# Patient Record
Sex: MALE | Race: WHITE | Employment: FULL TIME | URBAN - NONMETROPOLITAN AREA
[De-identification: names, ages, dates, MRNs, and addresses within clinical notes are randomized per-mention and may not be internally consistent; named-entity substitution may affect disease eponyms.]

---

## 2018-06-21 ENCOUNTER — OFFICE VISIT (OUTPATIENT)
Dept: FAMILY MEDICINE CLINIC | Age: 58
End: 2018-06-21
Payer: COMMERCIAL

## 2018-06-21 VITALS
DIASTOLIC BLOOD PRESSURE: 66 MMHG | SYSTOLIC BLOOD PRESSURE: 120 MMHG | WEIGHT: 209 LBS | BODY MASS INDEX: 31.67 KG/M2 | HEART RATE: 80 BPM | HEIGHT: 68 IN

## 2018-06-21 DIAGNOSIS — M65.312 TRIGGER FINGER OF LEFT THUMB: Primary | ICD-10-CM

## 2018-06-21 PROCEDURE — 99202 OFFICE O/P NEW SF 15 MIN: CPT | Performed by: FAMILY MEDICINE

## 2018-06-21 RX ORDER — MELOXICAM 15 MG/1
15 TABLET ORAL DAILY
Qty: 30 TABLET | Refills: 2 | Status: SHIPPED | OUTPATIENT
Start: 2018-06-21 | End: 2019-07-09

## 2018-06-21 RX ORDER — RANITIDINE 150 MG/1
150 TABLET ORAL 2 TIMES DAILY
COMMUNITY
End: 2019-07-09

## 2018-06-21 RX ORDER — FEXOFENADINE HYDROCHLORIDE 60 MG/1
60 TABLET, FILM COATED ORAL DAILY
COMMUNITY
End: 2021-02-25

## 2018-06-21 RX ORDER — CETIRIZINE HYDROCHLORIDE 10 MG/1
10 TABLET ORAL DAILY
COMMUNITY
End: 2018-06-21 | Stop reason: CLARIF

## 2018-06-21 RX ORDER — OMEPRAZOLE 20 MG/1
20 CAPSULE, DELAYED RELEASE ORAL DAILY
COMMUNITY
End: 2018-06-21 | Stop reason: CLARIF

## 2018-06-21 ASSESSMENT — PATIENT HEALTH QUESTIONNAIRE - PHQ9
SUM OF ALL RESPONSES TO PHQ QUESTIONS 1-9: 0
2. FEELING DOWN, DEPRESSED OR HOPELESS: 0
SUM OF ALL RESPONSES TO PHQ9 QUESTIONS 1 & 2: 0
1. LITTLE INTEREST OR PLEASURE IN DOING THINGS: 0

## 2019-07-09 ENCOUNTER — OFFICE VISIT (OUTPATIENT)
Dept: FAMILY MEDICINE CLINIC | Age: 59
End: 2019-07-09
Payer: COMMERCIAL

## 2019-07-09 VITALS
HEIGHT: 68 IN | WEIGHT: 212 LBS | HEART RATE: 92 BPM | SYSTOLIC BLOOD PRESSURE: 126 MMHG | DIASTOLIC BLOOD PRESSURE: 62 MMHG | BODY MASS INDEX: 32.13 KG/M2 | OXYGEN SATURATION: 97 %

## 2019-07-09 DIAGNOSIS — Z00.01 ENCOUNTER FOR WELL ADULT EXAM WITH ABNORMAL FINDINGS: Primary | ICD-10-CM

## 2019-07-09 DIAGNOSIS — Z11.59 ENCOUNTER FOR HEPATITIS C SCREENING TEST FOR LOW RISK PATIENT: ICD-10-CM

## 2019-07-09 DIAGNOSIS — M79.601 PAIN IN BOTH UPPER EXTREMITIES: ICD-10-CM

## 2019-07-09 DIAGNOSIS — Z23 NEED FOR TDAP VACCINATION: ICD-10-CM

## 2019-07-09 DIAGNOSIS — M79.602 PAIN IN BOTH UPPER EXTREMITIES: ICD-10-CM

## 2019-07-09 DIAGNOSIS — Z13.1 SCREENING FOR DIABETES MELLITUS: ICD-10-CM

## 2019-07-09 DIAGNOSIS — Z12.5 SCREENING FOR PROSTATE CANCER: ICD-10-CM

## 2019-07-09 DIAGNOSIS — Z12.11 SCREEN FOR COLON CANCER: ICD-10-CM

## 2019-07-09 DIAGNOSIS — Z13.220 SCREENING, LIPID: ICD-10-CM

## 2019-07-09 PROCEDURE — 99396 PREV VISIT EST AGE 40-64: CPT | Performed by: FAMILY MEDICINE

## 2019-07-09 PROCEDURE — 90715 TDAP VACCINE 7 YRS/> IM: CPT | Performed by: FAMILY MEDICINE

## 2019-07-09 PROCEDURE — 90471 IMMUNIZATION ADMIN: CPT | Performed by: FAMILY MEDICINE

## 2019-07-09 RX ORDER — IBUPROFEN 800 MG/1
800 TABLET ORAL EVERY 6 HOURS PRN
COMMUNITY
End: 2019-07-09 | Stop reason: ALTCHOICE

## 2019-07-09 RX ORDER — MELOXICAM 15 MG/1
15 TABLET ORAL DAILY
Qty: 30 TABLET | Refills: 1 | Status: ON HOLD | OUTPATIENT
Start: 2019-07-09 | End: 2021-01-16

## 2019-07-09 RX ORDER — CEPHALEXIN 500 MG/1
500 CAPSULE ORAL 4 TIMES DAILY
Status: ON HOLD | COMMUNITY
End: 2021-01-16

## 2019-07-09 ASSESSMENT — ENCOUNTER SYMPTOMS: SHORTNESS OF BREATH: 0

## 2019-07-09 ASSESSMENT — PATIENT HEALTH QUESTIONNAIRE - PHQ9
SUM OF ALL RESPONSES TO PHQ9 QUESTIONS 1 & 2: 0
1. LITTLE INTEREST OR PLEASURE IN DOING THINGS: 0
SUM OF ALL RESPONSES TO PHQ QUESTIONS 1-9: 0
2. FEELING DOWN, DEPRESSED OR HOPELESS: 0
SUM OF ALL RESPONSES TO PHQ QUESTIONS 1-9: 0

## 2019-07-09 NOTE — PROGRESS NOTES
[address] [address]  Dept: [phone]  Dept Fax: [phone]    Kadie Mcallister is a 64 y.o. male who presents today for his medical conditions/complaints as noted below. Kadie Mcallister c/o of Annual Exam      HPI:     HPI  Patient here for annual wellness visit. Also at this time complaining of right arm muscular pain. Right wrist pain feeling related to likely carpal tunnel and left thigh pain cramping at times. No attempted treatments for these issues have been taken at this time. Left hand numbness daily, only occasionally while sleeping. Right forearm pain, often painting as well as \"throwing posts\"  Cramps posterior left thigh and noted with prostate cancer issues on internet could show issues in thighs    Health team concerned from biometric screening for BP per pt. BP Readings from Last 3 Encounters:   07/09/19 126/62   06/21/18 120/66          (goal 120/80)    No past medical history on file. No past surgical history on file. No family history on file. Social History     Tobacco Use    Smoking status: Never Smoker    Smokeless tobacco: Never Used   Substance Use Topics    Alcohol use: Yes     Comment: Once in a while      Prior to Admission medications    Medication Sig Start Date End Date Taking?  Authorizing Provider   cephALEXin (KEFLEX) 500 MG capsule Take 500 mg by mouth 4 times daily   Yes Historical Provider, MD   ibuprofen (ADVIL;MOTRIN) 800 MG tablet Take 800 mg by mouth every 6 hours as needed for Pain   Yes Historical Provider, MD   meloxicam (MOBIC) 15 MG tablet Take 1 tablet by mouth daily 7/9/19  Yes Leslie Renner MD   fexofenadine (ALLEGRA ALLERGY) 60 MG tablet Take 60 mg by mouth daily   Yes Historical Provider, MD     Allergies   Allergen Reactions    Penicillins        Health Maintenance   Topic Date Due    Hepatitis C screen  1960    HIV screen  04/21/1977    DTaP/Tdap/Td vaccine (1 - Tdap)

## 2019-07-11 LAB
CHOLESTEROL/HDL RATIO: 3.2 RATIO (ref 0–4.5)
CHOLESTEROL: 185 MG/DL (ref 50–200)
GLUCOSE: 105 MG/DL (ref 75–110)
HDL, DIRECT: 57 MG/DL (ref 36–68)
HEPATITIS C IGG: NORMAL
LDL CHOLESTEROL CALCULATED: 112.4 MG/DL (ref 0–160)
SCREENING PSA: 0.93 NG/ML (ref 0–4)
SIGNAL/CUTOFF: NORMAL
TRIGL SERPL-MCNC: 78 MG/DL (ref 10–250)
VLDLC SERPL CALC-MCNC: 16 MG/DL (ref 0–40)

## 2019-07-30 DIAGNOSIS — Z12.11 SCREEN FOR COLON CANCER: ICD-10-CM

## 2019-08-08 PROBLEM — Z00.01 ENCOUNTER FOR WELL ADULT EXAM WITH ABNORMAL FINDINGS: Status: RESOLVED | Noted: 2019-07-09 | Resolved: 2019-08-08

## 2021-01-14 ENCOUNTER — HOSPITAL ENCOUNTER (INPATIENT)
Age: 61
LOS: 1 days | Discharge: HOME | DRG: 280 | End: 2021-01-16
Attending: EMERGENCY MEDICINE | Admitting: INTERNAL MEDICINE
Payer: COMMERCIAL

## 2021-01-14 DIAGNOSIS — I21.4 NSTEMI (NON-ST ELEVATED MYOCARDIAL INFARCTION) (HCC): Primary | ICD-10-CM

## 2021-01-14 LAB
ANTI-XA UNFRAC HEPARIN: <0.1 IU/L (ref 0.3–0.7)
HCT VFR BLD CALC: 45.9 % (ref 40.7–50.3)
HEMOGLOBIN: 14.6 G/DL (ref 13–17)
INR BLD: 1
MCH RBC QN AUTO: 29.6 PG (ref 25.2–33.5)
MCHC RBC AUTO-ENTMCNC: 31.8 G/DL (ref 28.4–34.8)
MCV RBC AUTO: 93.1 FL (ref 82.6–102.9)
MYOGLOBIN: 146 NG/ML (ref 28–72)
NRBC AUTOMATED: 0 PER 100 WBC
PARTIAL THROMBOPLASTIN TIME: 30.5 SEC (ref 23.9–33.8)
PDW BLD-RTO: 13.2 % (ref 11.8–14.4)
PLATELET # BLD: 272 K/UL (ref 138–453)
PMV BLD AUTO: 9.6 FL (ref 8.1–13.5)
PROTHROMBIN TIME: 12.6 SEC (ref 11.5–14.2)
RBC # BLD: 4.93 M/UL (ref 4.21–5.77)
SARS-COV-2: POSITIVE
TROPONIN INTERP: ABNORMAL
TROPONIN T: ABNORMAL NG/ML
TROPONIN, HIGH SENSITIVITY: 886 NG/L (ref 0–22)
WBC # BLD: 13.8 K/UL (ref 3.5–11.3)

## 2021-01-14 PROCEDURE — 6360000002 HC RX W HCPCS: Performed by: EMERGENCY MEDICINE

## 2021-01-14 PROCEDURE — 99284 EMERGENCY DEPT VISIT MOD MDM: CPT

## 2021-01-14 PROCEDURE — 84484 ASSAY OF TROPONIN QUANT: CPT

## 2021-01-14 PROCEDURE — 83874 ASSAY OF MYOGLOBIN: CPT

## 2021-01-14 PROCEDURE — 85730 THROMBOPLASTIN TIME PARTIAL: CPT

## 2021-01-14 PROCEDURE — 2500000003 HC RX 250 WO HCPCS: Performed by: EMERGENCY MEDICINE

## 2021-01-14 PROCEDURE — 85520 HEPARIN ASSAY: CPT

## 2021-01-14 PROCEDURE — 85610 PROTHROMBIN TIME: CPT

## 2021-01-14 PROCEDURE — G0378 HOSPITAL OBSERVATION PER HR: HCPCS

## 2021-01-14 PROCEDURE — 85027 COMPLETE CBC AUTOMATED: CPT

## 2021-01-14 PROCEDURE — 93005 ELECTROCARDIOGRAM TRACING: CPT | Performed by: EMERGENCY MEDICINE

## 2021-01-14 RX ORDER — HEPARIN SODIUM 1000 [USP'U]/ML
2000 INJECTION, SOLUTION INTRAVENOUS; SUBCUTANEOUS PRN
Status: DISCONTINUED | OUTPATIENT
Start: 2021-01-14 | End: 2021-01-15 | Stop reason: SDUPTHER

## 2021-01-14 RX ORDER — HEPARIN SODIUM 1000 [USP'U]/ML
4000 INJECTION, SOLUTION INTRAVENOUS; SUBCUTANEOUS PRN
Status: DISCONTINUED | OUTPATIENT
Start: 2021-01-14 | End: 2021-01-15 | Stop reason: SDUPTHER

## 2021-01-14 RX ORDER — HEPARIN SODIUM 1000 [USP'U]/ML
4000 INJECTION, SOLUTION INTRAVENOUS; SUBCUTANEOUS ONCE
Status: COMPLETED | OUTPATIENT
Start: 2021-01-14 | End: 2021-01-14

## 2021-01-14 RX ORDER — HEPARIN SODIUM 10000 [USP'U]/100ML
10.8 INJECTION, SOLUTION INTRAVENOUS CONTINUOUS
Status: DISCONTINUED | OUTPATIENT
Start: 2021-01-14 | End: 2021-01-15 | Stop reason: SDUPTHER

## 2021-01-14 RX ORDER — NITROGLYCERIN 20 MG/100ML
10 INJECTION INTRAVENOUS CONTINUOUS
Status: DISCONTINUED | OUTPATIENT
Start: 2021-01-14 | End: 2021-01-16 | Stop reason: HOSPADM

## 2021-01-14 RX ADMIN — NITROGLYCERIN 10 MCG/MIN: 20 INJECTION INTRAVENOUS at 23:22

## 2021-01-14 RX ADMIN — HEPARIN SODIUM 4000 UNITS: 1000 INJECTION INTRAVENOUS; SUBCUTANEOUS at 23:44

## 2021-01-14 RX ADMIN — HEPARIN SODIUM AND DEXTROSE 10.8 UNITS/KG/HR: 10000; 5 INJECTION INTRAVENOUS at 23:47

## 2021-01-14 ASSESSMENT — ENCOUNTER SYMPTOMS
EYE DISCHARGE: 0
VOMITING: 0
ABDOMINAL PAIN: 0
DIARRHEA: 0
CONSTIPATION: 0
COLOR CHANGE: 0
SHORTNESS OF BREATH: 0
COUGH: 0
FACIAL SWELLING: 0
EYE REDNESS: 0

## 2021-01-14 ASSESSMENT — PAIN DESCRIPTION - LOCATION: LOCATION: CHEST

## 2021-01-14 ASSESSMENT — PAIN SCALES - GENERAL: PAINLEVEL_OUTOF10: 4

## 2021-01-15 PROBLEM — G47.33 OSA (OBSTRUCTIVE SLEEP APNEA): Status: ACTIVE | Noted: 2021-01-15

## 2021-01-15 PROBLEM — I10 ESSENTIAL HYPERTENSION: Status: ACTIVE | Noted: 2021-01-15

## 2021-01-15 PROBLEM — E66.9 OBESITY (BMI 30-39.9): Status: ACTIVE | Noted: 2019-07-09

## 2021-01-15 PROBLEM — U07.1 COVID-19: Status: ACTIVE | Noted: 2021-01-15

## 2021-01-15 LAB
ADENOVIRUS PCR: NOT DETECTED
ALBUMIN SERPL-MCNC: 3.8 G/DL (ref 3.5–5.2)
ALBUMIN/GLOBULIN RATIO: ABNORMAL (ref 1–2.5)
ALP BLD-CCNC: 70 U/L (ref 40–129)
ALT SERPL-CCNC: 38 U/L (ref 5–41)
ANION GAP SERPL CALCULATED.3IONS-SCNC: 9 MMOL/L (ref 9–17)
ANTI-XA UNFRAC HEPARIN: 0.21 IU/L (ref 0.3–0.7)
ANTI-XA UNFRAC HEPARIN: <0.1 IU/L (ref 0.3–0.7)
AST SERPL-CCNC: 164 U/L
BILIRUB SERPL-MCNC: 1.23 MG/DL (ref 0.3–1.2)
BNP INTERPRETATION: ABNORMAL
BORDETELLA PARAPERTUSSIS: NOT DETECTED
BORDETELLA PERTUSSIS PCR: NOT DETECTED
BUN BLDV-MCNC: 15 MG/DL (ref 6–20)
BUN/CREAT BLD: 16 (ref 9–20)
C-REACTIVE PROTEIN: 19.6 MG/L (ref 0–5)
CALCIUM SERPL-MCNC: 9.1 MG/DL (ref 8.6–10.4)
CHLAMYDIA PNEUMONIAE BY PCR: NOT DETECTED
CHLORIDE BLD-SCNC: 103 MMOL/L (ref 98–107)
CHOLESTEROL/HDL RATIO: 2.7
CHOLESTEROL: 165 MG/DL
CO2: 25 MMOL/L (ref 20–31)
CORONAVIRUS 229E PCR: NOT DETECTED
CORONAVIRUS HKU1 PCR: NOT DETECTED
CORONAVIRUS NL63 PCR: NOT DETECTED
CORONAVIRUS OC43 PCR: NOT DETECTED
CREAT SERPL-MCNC: 0.92 MG/DL (ref 0.7–1.2)
D-DIMER QUANTITATIVE: <0.27 MG/L FEU (ref 0–0.59)
FERRITIN: 263 UG/L (ref 30–400)
FIBRINOGEN: 502 MG/DL (ref 179–518)
GFR AFRICAN AMERICAN: >60 ML/MIN
GFR NON-AFRICAN AMERICAN: >60 ML/MIN
GFR SERPL CREATININE-BSD FRML MDRD: ABNORMAL ML/MIN/{1.73_M2}
GFR SERPL CREATININE-BSD FRML MDRD: ABNORMAL ML/MIN/{1.73_M2}
GLUCOSE BLD-MCNC: 126 MG/DL (ref 70–99)
HCT VFR BLD CALC: 44.2 % (ref 40.7–50.3)
HCT VFR BLD CALC: 45 % (ref 40.7–50.3)
HDLC SERPL-MCNC: 62 MG/DL
HEMOGLOBIN: 14.2 G/DL (ref 13–17)
HEMOGLOBIN: 14.4 G/DL (ref 13–17)
HUMAN METAPNEUMOVIRUS PCR: NOT DETECTED
INFLUENZA A BY PCR: NOT DETECTED
INFLUENZA A H1 (2009) PCR: NORMAL
INFLUENZA A H1 PCR: NORMAL
INFLUENZA A H3 PCR: NORMAL
INFLUENZA B BY PCR: NOT DETECTED
INR BLD: 1
LACTATE DEHYDROGENASE: 678 U/L (ref 135–225)
LDL CHOLESTEROL: 86 MG/DL (ref 0–130)
MAGNESIUM: 1.8 MG/DL (ref 1.6–2.6)
MCH RBC QN AUTO: 29.4 PG (ref 25.2–33.5)
MCH RBC QN AUTO: 29.5 PG (ref 25.2–33.5)
MCHC RBC AUTO-ENTMCNC: 32 G/DL (ref 28.4–34.8)
MCHC RBC AUTO-ENTMCNC: 32.1 G/DL (ref 28.4–34.8)
MCV RBC AUTO: 91.8 FL (ref 82.6–102.9)
MCV RBC AUTO: 91.9 FL (ref 82.6–102.9)
MYCOPLASMA PNEUMONIAE PCR: NOT DETECTED
MYOGLOBIN: 128 NG/ML (ref 28–72)
NRBC AUTOMATED: 0 PER 100 WBC
NRBC AUTOMATED: 0 PER 100 WBC
PARAINFLUENZA 1 PCR: NOT DETECTED
PARAINFLUENZA 2 PCR: NOT DETECTED
PARAINFLUENZA 3 PCR: NOT DETECTED
PARAINFLUENZA 4 PCR: NOT DETECTED
PARTIAL THROMBOPLASTIN TIME: 87.6 SEC (ref 23.9–33.8)
PDW BLD-RTO: 13.4 % (ref 11.8–14.4)
PDW BLD-RTO: 13.4 % (ref 11.8–14.4)
PLATELET # BLD: 266 K/UL (ref 138–453)
PLATELET # BLD: 269 K/UL (ref 138–453)
PMV BLD AUTO: 9.5 FL (ref 8.1–13.5)
PMV BLD AUTO: 9.6 FL (ref 8.1–13.5)
POTASSIUM SERPL-SCNC: 4 MMOL/L (ref 3.7–5.3)
PRO-BNP: 662 PG/ML
PROCALCITONIN: 0.07 NG/ML
PROTHROMBIN TIME: 13.3 SEC (ref 11.5–14.2)
RBC # BLD: 4.81 M/UL (ref 4.21–5.77)
RBC # BLD: 4.9 M/UL (ref 4.21–5.77)
RESP SYNCYTIAL VIRUS PCR: NOT DETECTED
RHINO/ENTEROVIRUS PCR: NOT DETECTED
SARS-COV-2 ANTIBODY, TOTAL: POSITIVE
SARS-COV-2, PCR: NOT DETECTED
SODIUM BLD-SCNC: 137 MMOL/L (ref 135–144)
SPECIMEN DESCRIPTION: NORMAL
TOTAL PROTEIN: 7.2 G/DL (ref 6.4–8.3)
TRIGL SERPL-MCNC: 85 MG/DL
TROPONIN INTERP: ABNORMAL
TROPONIN INTERP: ABNORMAL
TROPONIN T: ABNORMAL NG/ML
TROPONIN T: ABNORMAL NG/ML
TROPONIN, HIGH SENSITIVITY: 1040 NG/L (ref 0–22)
TROPONIN, HIGH SENSITIVITY: 952 NG/L (ref 0–22)
VITAMIN D 25-HYDROXY: 21.8 NG/ML (ref 30–100)
VLDLC SERPL CALC-MCNC: NORMAL MG/DL (ref 1–30)
WBC # BLD: 13 K/UL (ref 3.5–11.3)
WBC # BLD: 13.8 K/UL (ref 3.5–11.3)

## 2021-01-15 PROCEDURE — 6360000004 HC RX CONTRAST MEDICATION

## 2021-01-15 PROCEDURE — 85379 FIBRIN DEGRADATION QUANT: CPT

## 2021-01-15 PROCEDURE — 2709999900 HC NON-CHARGEABLE SUPPLY

## 2021-01-15 PROCEDURE — 80061 LIPID PANEL: CPT

## 2021-01-15 PROCEDURE — 36415 COLL VENOUS BLD VENIPUNCTURE: CPT

## 2021-01-15 PROCEDURE — 2580000003 HC RX 258: Performed by: INTERNAL MEDICINE

## 2021-01-15 PROCEDURE — 4A023N7 MEASUREMENT OF CARDIAC SAMPLING AND PRESSURE, LEFT HEART, PERCUTANEOUS APPROACH: ICD-10-PCS | Performed by: INTERNAL MEDICINE

## 2021-01-15 PROCEDURE — 1200000000 HC SEMI PRIVATE

## 2021-01-15 PROCEDURE — 6370000000 HC RX 637 (ALT 250 FOR IP): Performed by: INTERNAL MEDICINE

## 2021-01-15 PROCEDURE — APPSS45 APP SPLIT SHARED TIME 31-45 MINUTES: Performed by: NURSE PRACTITIONER

## 2021-01-15 PROCEDURE — 94761 N-INVAS EAR/PLS OXIMETRY MLT: CPT

## 2021-01-15 PROCEDURE — 83615 LACTATE (LD) (LDH) ENZYME: CPT

## 2021-01-15 PROCEDURE — 84145 PROCALCITONIN (PCT): CPT

## 2021-01-15 PROCEDURE — 85384 FIBRINOGEN ACTIVITY: CPT

## 2021-01-15 PROCEDURE — 84484 ASSAY OF TROPONIN QUANT: CPT

## 2021-01-15 PROCEDURE — 85730 THROMBOPLASTIN TIME PARTIAL: CPT

## 2021-01-15 PROCEDURE — 93458 L HRT ARTERY/VENTRICLE ANGIO: CPT | Performed by: INTERNAL MEDICINE

## 2021-01-15 PROCEDURE — G0378 HOSPITAL OBSERVATION PER HR: HCPCS

## 2021-01-15 PROCEDURE — 6370000000 HC RX 637 (ALT 250 FOR IP): Performed by: NURSE PRACTITIONER

## 2021-01-15 PROCEDURE — 6360000002 HC RX W HCPCS: Performed by: NURSE PRACTITIONER

## 2021-01-15 PROCEDURE — 85610 PROTHROMBIN TIME: CPT

## 2021-01-15 PROCEDURE — 86769 SARS-COV-2 COVID-19 ANTIBODY: CPT

## 2021-01-15 PROCEDURE — 85027 COMPLETE CBC AUTOMATED: CPT

## 2021-01-15 PROCEDURE — 83874 ASSAY OF MYOGLOBIN: CPT

## 2021-01-15 PROCEDURE — 2500000003 HC RX 250 WO HCPCS

## 2021-01-15 PROCEDURE — 0202U NFCT DS 22 TRGT SARS-COV-2: CPT

## 2021-01-15 PROCEDURE — 99222 1ST HOSP IP/OBS MODERATE 55: CPT | Performed by: INTERNAL MEDICINE

## 2021-01-15 PROCEDURE — 85520 HEPARIN ASSAY: CPT

## 2021-01-15 PROCEDURE — 83735 ASSAY OF MAGNESIUM: CPT

## 2021-01-15 PROCEDURE — 80053 COMPREHEN METABOLIC PANEL: CPT

## 2021-01-15 PROCEDURE — 83880 ASSAY OF NATRIURETIC PEPTIDE: CPT

## 2021-01-15 PROCEDURE — 6360000002 HC RX W HCPCS

## 2021-01-15 PROCEDURE — 86140 C-REACTIVE PROTEIN: CPT

## 2021-01-15 PROCEDURE — C1725 CATH, TRANSLUMIN NON-LASER: HCPCS

## 2021-01-15 PROCEDURE — 82306 VITAMIN D 25 HYDROXY: CPT

## 2021-01-15 PROCEDURE — C1894 INTRO/SHEATH, NON-LASER: HCPCS

## 2021-01-15 PROCEDURE — 93005 ELECTROCARDIOGRAM TRACING: CPT | Performed by: NURSE PRACTITIONER

## 2021-01-15 PROCEDURE — B2151ZZ FLUOROSCOPY OF LEFT HEART USING LOW OSMOLAR CONTRAST: ICD-10-PCS | Performed by: INTERNAL MEDICINE

## 2021-01-15 PROCEDURE — B2111ZZ FLUOROSCOPY OF MULTIPLE CORONARY ARTERIES USING LOW OSMOLAR CONTRAST: ICD-10-PCS | Performed by: INTERNAL MEDICINE

## 2021-01-15 PROCEDURE — 82728 ASSAY OF FERRITIN: CPT

## 2021-01-15 RX ORDER — HEPARIN SODIUM 10000 [USP'U]/100ML
10.8 INJECTION, SOLUTION INTRAVENOUS CONTINUOUS
Status: DISCONTINUED | OUTPATIENT
Start: 2021-01-15 | End: 2021-01-15

## 2021-01-15 RX ORDER — ACETAMINOPHEN 325 MG/1
650 TABLET ORAL EVERY 6 HOURS PRN
Status: DISCONTINUED | OUTPATIENT
Start: 2021-01-15 | End: 2021-01-15 | Stop reason: SDUPTHER

## 2021-01-15 RX ORDER — SODIUM CHLORIDE 0.9 % (FLUSH) 0.9 %
10 SYRINGE (ML) INJECTION PRN
Status: DISCONTINUED | OUTPATIENT
Start: 2021-01-15 | End: 2021-01-16 | Stop reason: HOSPADM

## 2021-01-15 RX ORDER — NITROGLYCERIN 0.4 MG/1
0.4 TABLET SUBLINGUAL EVERY 5 MIN PRN
Status: DISCONTINUED | OUTPATIENT
Start: 2021-01-15 | End: 2021-01-16 | Stop reason: HOSPADM

## 2021-01-15 RX ORDER — ACETAMINOPHEN 325 MG/1
650 TABLET ORAL EVERY 4 HOURS PRN
Status: DISCONTINUED | OUTPATIENT
Start: 2021-01-15 | End: 2021-01-16 | Stop reason: HOSPADM

## 2021-01-15 RX ORDER — ASPIRIN 81 MG/1
324 TABLET, CHEWABLE ORAL ONCE
Status: DISCONTINUED | OUTPATIENT
Start: 2021-01-15 | End: 2021-01-15

## 2021-01-15 RX ORDER — ASPIRIN 81 MG/1
81 TABLET, CHEWABLE ORAL DAILY
Status: DISCONTINUED | OUTPATIENT
Start: 2021-01-15 | End: 2021-01-16 | Stop reason: HOSPADM

## 2021-01-15 RX ORDER — SODIUM CHLORIDE 0.9 % (FLUSH) 0.9 %
10 SYRINGE (ML) INJECTION EVERY 12 HOURS SCHEDULED
Status: DISCONTINUED | OUTPATIENT
Start: 2021-01-15 | End: 2021-01-16 | Stop reason: HOSPADM

## 2021-01-15 RX ORDER — FENTANYL CITRATE 50 UG/ML
25 INJECTION, SOLUTION INTRAMUSCULAR; INTRAVENOUS
Status: ACTIVE | OUTPATIENT
Start: 2021-01-15 | End: 2021-01-15

## 2021-01-15 RX ORDER — ONDANSETRON 2 MG/ML
4 INJECTION INTRAMUSCULAR; INTRAVENOUS EVERY 6 HOURS PRN
Status: DISCONTINUED | OUTPATIENT
Start: 2021-01-15 | End: 2021-01-16 | Stop reason: HOSPADM

## 2021-01-15 RX ORDER — HEPARIN SODIUM 1000 [USP'U]/ML
4000 INJECTION, SOLUTION INTRAVENOUS; SUBCUTANEOUS PRN
Status: DISCONTINUED | OUTPATIENT
Start: 2021-01-15 | End: 2021-01-15

## 2021-01-15 RX ORDER — SODIUM CHLORIDE 9 MG/ML
INJECTION, SOLUTION INTRAVENOUS CONTINUOUS
Status: ACTIVE | OUTPATIENT
Start: 2021-01-15 | End: 2021-01-15

## 2021-01-15 RX ORDER — ACETAMINOPHEN 650 MG/1
650 SUPPOSITORY RECTAL EVERY 6 HOURS PRN
Status: DISCONTINUED | OUTPATIENT
Start: 2021-01-15 | End: 2021-01-16 | Stop reason: HOSPADM

## 2021-01-15 RX ORDER — ATORVASTATIN CALCIUM 80 MG/1
80 TABLET, FILM COATED ORAL NIGHTLY
Status: DISCONTINUED | OUTPATIENT
Start: 2021-01-15 | End: 2021-01-16 | Stop reason: HOSPADM

## 2021-01-15 RX ORDER — HEPARIN SODIUM 1000 [USP'U]/ML
2000 INJECTION, SOLUTION INTRAVENOUS; SUBCUTANEOUS PRN
Status: DISCONTINUED | OUTPATIENT
Start: 2021-01-15 | End: 2021-01-15

## 2021-01-15 RX ORDER — MAGNESIUM SULFATE 1 G/100ML
1 INJECTION INTRAVENOUS PRN
Status: DISCONTINUED | OUTPATIENT
Start: 2021-01-15 | End: 2021-01-16 | Stop reason: HOSPADM

## 2021-01-15 RX ORDER — FAMOTIDINE 20 MG/1
20 TABLET, FILM COATED ORAL 2 TIMES DAILY
Status: DISCONTINUED | OUTPATIENT
Start: 2021-01-15 | End: 2021-01-16 | Stop reason: HOSPADM

## 2021-01-15 RX ORDER — POTASSIUM CHLORIDE 20 MEQ/1
40 TABLET, EXTENDED RELEASE ORAL PRN
Status: DISCONTINUED | OUTPATIENT
Start: 2021-01-15 | End: 2021-01-16 | Stop reason: HOSPADM

## 2021-01-15 RX ORDER — PROMETHAZINE HYDROCHLORIDE 12.5 MG/1
12.5 TABLET ORAL EVERY 6 HOURS PRN
Status: DISCONTINUED | OUTPATIENT
Start: 2021-01-15 | End: 2021-01-16 | Stop reason: HOSPADM

## 2021-01-15 RX ORDER — GUAIFENESIN/DEXTROMETHORPHAN 100-10MG/5
5 SYRUP ORAL EVERY 4 HOURS PRN
Status: DISCONTINUED | OUTPATIENT
Start: 2021-01-15 | End: 2021-01-16 | Stop reason: HOSPADM

## 2021-01-15 RX ORDER — POTASSIUM CHLORIDE 7.45 MG/ML
10 INJECTION INTRAVENOUS PRN
Status: DISCONTINUED | OUTPATIENT
Start: 2021-01-15 | End: 2021-01-16 | Stop reason: HOSPADM

## 2021-01-15 RX ORDER — CETIRIZINE HYDROCHLORIDE 10 MG/1
10 TABLET ORAL DAILY
Status: DISCONTINUED | OUTPATIENT
Start: 2021-01-15 | End: 2021-01-16 | Stop reason: HOSPADM

## 2021-01-15 RX ADMIN — METOPROLOL TARTRATE 25 MG: 25 TABLET, FILM COATED ORAL at 14:15

## 2021-01-15 RX ADMIN — Medication 10 ML: at 21:47

## 2021-01-15 RX ADMIN — SODIUM CHLORIDE, PRESERVATIVE FREE 10 ML: 5 INJECTION INTRAVENOUS at 21:47

## 2021-01-15 RX ADMIN — SODIUM CHLORIDE, PRESERVATIVE FREE 10 ML: 5 INJECTION INTRAVENOUS at 12:00

## 2021-01-15 RX ADMIN — CETIRIZINE HYDROCHLORIDE 10 MG: 10 TABLET, FILM COATED ORAL at 14:15

## 2021-01-15 RX ADMIN — ACETAMINOPHEN 650 MG: 325 TABLET ORAL at 05:16

## 2021-01-15 RX ADMIN — FAMOTIDINE 20 MG: 20 TABLET, FILM COATED ORAL at 21:47

## 2021-01-15 RX ADMIN — ATORVASTATIN CALCIUM 80 MG: 80 TABLET, FILM COATED ORAL at 21:47

## 2021-01-15 RX ADMIN — HEPARIN SODIUM AND DEXTROSE 10.8 UNITS/KG/HR: 10000; 5 INJECTION INTRAVENOUS at 00:54

## 2021-01-15 RX ADMIN — ASPIRIN 81 MG: 81 TABLET, CHEWABLE ORAL at 14:15

## 2021-01-15 RX ADMIN — METOPROLOL TARTRATE 25 MG: 25 TABLET, FILM COATED ORAL at 01:03

## 2021-01-15 RX ADMIN — HEPARIN SODIUM 2000 UNITS: 1000 INJECTION INTRAVENOUS; SUBCUTANEOUS at 05:39

## 2021-01-15 RX ADMIN — METOPROLOL TARTRATE 25 MG: 25 TABLET, FILM COATED ORAL at 21:47

## 2021-01-15 RX ADMIN — FAMOTIDINE 20 MG: 20 TABLET, FILM COATED ORAL at 14:15

## 2021-01-15 RX ADMIN — ATORVASTATIN CALCIUM 80 MG: 80 TABLET, FILM COATED ORAL at 01:02

## 2021-01-15 SDOH — HEALTH STABILITY: MENTAL HEALTH: HOW MANY STANDARD DRINKS CONTAINING ALCOHOL DO YOU HAVE ON A TYPICAL DAY?: NOT ASKED

## 2021-01-15 ASSESSMENT — PAIN SCALES - GENERAL
PAINLEVEL_OUTOF10: 0
PAINLEVEL_OUTOF10: 4
PAINLEVEL_OUTOF10: 0
PAINLEVEL_OUTOF10: 0

## 2021-01-15 NOTE — CARE COORDINATION
Case Management Initial Discharge Plan  David Villarreal,             Met with:patient to discuss discharge plans. Information verified: address, contacts, phone number, , insurance Yes    Emergency Contact/Next of Kin name & number: Taylor/spouse   [phone]    PCP: Phoebe Rowe MD  Date of last visit: 6 months ago? Insurance Provider: MMO    Discharge Planning    Living Arrangements:      Support Systems:       Home has 3 stories  5 stairs to climb to get into front door, severalstairs to climb to reach second floor  Location of bedroom/bathroom in home 2nd floor    Patient able to perform ADL's:Independent    Current Services (outpatient & in home) none  DME equipment: 0  DME provider: 0    Receiving oral anticoagulation therapy? No    If indicated:   Physician managing anticoagulation treatment:   Where does patient obtain lab work for ATC treatment? Potential Assistance Needed:       Patient agreeable to home care: No  [place] of choice provided:  n/a    Prior SNF/Rehab Placement and Facility: n/a  Agreeable to SNF/Rehab: No  [place] of choice provided: n/a     Evaluation: no    Expected Discharge date:       Patient expects to be discharged to: Follow Up Appointment: Best Day/ Time:      Transportation provider: spouse  Transportation arrangements needed for discharge: No    Readmission Risk              Risk of Unplanned Readmission:        0             Does patient have a readmission risk score greater than 14?: No  If yes, follow-up appointment must be made within 7 days of discharge. Goals of Care:       Discharge Plan: Dg COVID+    NSTEMI  Spoke with patient's spouse, Katherine Motta  Patient lives in a 3 story home with bedroom and bathroom on 2nd floor.  5 steps to enter home  Patient is independent and employed  Does not use any DME and has never had [facility] [employer].  Patient drives  Pharmacy is [facility]  Will have transportation home  Continue to follow for any new medications.   Cardiology consulted          Electronically signed by Lunda Aase, RN on 1/15/21 at 2:16 PM EST

## 2021-01-15 NOTE — ED PROVIDER NOTES
[address] [place] [address] ED  EMERGENCY DEPARTMENT ENCOUNTER      Pt Name: Emily Rausch  MRN: [id number]  [place] 1960  Date of evaluation: 1/14/2021  Provider: Phill Sethi MD    CHIEF COMPLAINT       Chief Complaint   Patient presents with    Chest Pain     [facility], NSTEMI, covid +         HISTORY OF PRESENT ILLNESS  (Location/Symptom, Timing/Onset, Context/Setting, Quality, Duration, Modifying Factors, Severity.)   Emily Rausch is a 64 y.o. male who presents to the emergency department for chest pain. He was seen at [facility] and was diagnosed with NSTEMI. He was transferred here for appropriate cardiac care. In the course of his testing his Covid test came back positive. He has had no Covid symptoms, no fever cough or shortness of breath. He has no pain now. His troponin was elevated but he did not have ST segment elevation. The pain began yesterday but then came back today and he did not sleep well last night. He was transferred on heparin drip and nitroglycerin drip. Nursing Notes were reviewed. ALLERGIES     Penicillins    CURRENT MEDICATIONS       Previous Medications    CEPHALEXIN (KEFLEX) 500 MG CAPSULE    Take 500 mg by mouth 4 times daily    FEXOFENADINE (ALLEGRA ALLERGY) 60 MG TABLET    Take 60 mg by mouth daily    MELOXICAM (MOBIC) 15 MG TABLET    Take 1 tablet by mouth daily       PAST MEDICAL HISTORY   No past medical history on file. SURGICAL HISTORY     No past surgical history on file. FAMILY HISTORY           Problem Relation Age of Onset    Heart Attack Father     Heart Attack Paternal Uncle     Heart Attack Paternal Grandmother      Family Status   Relation Name Status    Father  (Not Specified)    PUnc  (Not Specified)    PGM  (Not Specified)        SOCIAL HISTORY      reports that he has never smoked. He has never used smokeless tobacco. He reports current alcohol use. He reports that he does not use drugs.     REVIEW OF SYSTEMS (2-9 systems for level 4, 10 or more for level 5)     Review of Systems   Constitutional: Negative for chills, fatigue and fever. HENT: Negative for congestion, ear discharge and facial swelling. Eyes: Negative for discharge and redness. Respiratory: Negative for cough and shortness of breath. Cardiovascular: Positive for chest pain. Gastrointestinal: Negative for abdominal pain, constipation, diarrhea and vomiting. Genitourinary: Negative for dysuria and hematuria. Musculoskeletal: Negative for arthralgias. Skin: Negative for color change and rash. Neurological: Negative for syncope, numbness and headaches. Hematological: Negative for adenopathy. Psychiatric/Behavioral: Negative for confusion. The patient is not nervous/anxious. Except as noted above the remainder of the review of systems was reviewed and negative. PHYSICAL EXAM    (up to 7 for level 4, 8 or more for level 5)     Vitals:    01/14/21 2213 01/14/21 2218 01/14/21 2219 01/14/21 2227   BP: (!) 127/95   130/86   Pulse: 75  77 74   Resp: 19  17 18   Temp:  98.9 °F (37.2 °C)     TempSrc:  Oral     SpO2: 94%  94% 94%   Weight: 205 lb (93 kg)      Height: 5' 8\" (1.727 m)          Physical Exam  Constitutional:       General: He is not in acute distress. Appearance: He is well-developed. He is not diaphoretic. HENT:      Head: Normocephalic and atraumatic. Eyes:      General: No scleral icterus. Right eye: No discharge. Left eye: No discharge. Neck:      Musculoskeletal: Neck supple. Cardiovascular:      Rate and Rhythm: Normal rate and regular rhythm. Pulmonary:      Effort: Pulmonary effort is normal. No respiratory distress. Breath sounds: No stridor. Abdominal:      General: There is no distension. Musculoskeletal: Normal range of motion. Skin:     Findings: No erythema or rash. Neurological:      Mental Status: He is alert and oriented to person, place, and time. Psychiatric:         Behavior: Behavior normal.             DIAGNOSTIC RESULTS     EKG: All EKG's are interpreted by the Emergency Department Physician who either signs or Co-signs this chart in the absence of a cardiologist.    EKG here shows sinus rhythm without acute change. Slight ST depression in leads II, III and aVF. RADIOLOGY:   Non-plain film images such as CT, Ultrasound and MRI are read by the radiologist. Plain radiographic images are visualized and preliminarily interpreted by the emergency physician with the below findings:    Interpretation per the Radiologist below, if available at the time of this note:        LABS:  Labs Reviewed   TROP/MYOGLOBIN   TROP/MYOGLOBIN       All other labs were within normal range or not returned as of this dictation. EMERGENCY DEPARTMENT COURSE and DIFFERENTIAL DIAGNOSIS/MDM:   Vitals:    Vitals:    01/14/21 2213 01/14/21 2218 01/14/21 2219 01/14/21 2227   BP: (!) 127/95   130/86   Pulse: 75  77 74   Resp: 19  17 18   Temp:  98.9 °F (37.2 °C)     TempSrc:  Oral     SpO2: 94%  94% 94%   Weight: 205 lb (93 kg)      Height: 5' 8\" (1.727 m)          No orders of the defined types were placed in this encounter. Medical Decision Making: He is maintained on the nitroglycerin and heparin and is being admitted. He is hemodynamically stable. Treatment diagnosis and disposition were discussed with the patient. CRITICAL CARE TIME     Due to the high probability of sudden and clinically significant deterioration in the patient's condition he required highest level of my preparedness to intervene urgently. I provided critical care time including documentation time, medication orders and management, reevaluation, vital sign assessment, ordering and reviewing of of lab tests ordering and reviewing of x-ray studies, and admission orders.  Aggregate critical care time is 35 minutes including only time during which I was engaged in work directly related to his care and did not include time spent treating other patients simultaneously. CONSULTS:  None    PROCEDURES:  None    FINAL IMPRESSION      1. NSTEMI (non-ST elevated myocardial infarction) [facility])          DISPOSITION/PLAN   DISPOSITION Decision To Admit 01/14/2021 10:37:05 PM      PATIENT REFERRED TO:   No follow-up provider specified.     DISCHARGE MEDICATIONS:     New Prescriptions    No medications on file         (Please note that portions of this note were completed with a voice recognition program.  Efforts were made to edit the dictations but occasionally words are mis-transcribed.)    Christian Cedillo MD  Attending Emergency Physician           Christian Cedillo MD  01/14/21 1104

## 2021-01-15 NOTE — ED NOTES
Dr. Kristy Travis spoke with pt.      José Miguel Pak, [facility]  01/15/21 7646

## 2021-01-15 NOTE — CONSULTS
Section of Cardiology   Consult Note      Reason for Consult: Elevated troponin  Requesting Physician: No att. providers found    CHIEF COMPLAINT: Chest pain    History Obtained From:  patient, electronic medical record, patient's nurse    HISTORY OF PRESENT ILLNESS:      The patient is a 64 y.o. male with no significant past medical history  who presents with chest pain to a local hospital.  According to the patient, the night before last, he could not sleep due to persistent chest pain. He did not seek any medical attention until the next day evening and his troponin was elevated. He was started on treatment and was transferred to [facility] emergency room where I was contacted. I advised to continue heparin drip and to add beta-blocker and aspirin to his regimen as well as statin. Incidentally the patient was tested positive for COVID-19 infection and placed on strict isolation. The interview with the patient was done via phone. The patient denies seeing a doctor for many years and does not take any medications. He does not report chest pain in the past or dyspnea. According to him the discomfort was severe without significant radiation. No nausea or vomiting. No bleeding from any orifice. Denies any history of PND orthopnea. No edema or claudication by history. Since transfer from last night the patient did not report any chest pain. Previous diagnostic testing for coronary artery disease includes: none. Previous history of cardiac disease includes: none. Coronary artery disease risk factors include: advanced age (older than 54 for men, 72 for women), male gender and sedentary lifestyle. Past Medical History:    No past medical history on file. Past Surgical History:    No past surgical history on file. Home Medications:  Prior to Admission medications    Medication Sig Start Date End Date Taking?  Authorizing Provider   cephALEXin (KEFLEX) 500 MG capsule Take 500 mg by mouth 4 times daily    Historical Provider, MD   meloxicam (MOBIC) 15 MG tablet Take 1 tablet by mouth daily 7/9/19   Roxanna Ag MD   fexofenadine (ALLEGRA ALLERGY) 60 MG tablet Take 60 mg by mouth daily    Historical Provider, MD     Current Medications:    Current Facility-Administered Medications   Medication Dose Route Frequency Provider Last Rate Last Admin    cetirizine (ZYRTEC) tablet 10 mg  10 mg Oral Daily Bertinonsus CASEY RoblesN - CNP        sodium chloride flush 0.9 % injection 10 mL  10 mL Intravenous 2 times per day Alphonsus Travis APRN - CNP        sodium chloride flush 0.9 % injection 10 mL  10 mL Intravenous PRN Bertinonsus Travis, APRN - CNP        potassium chloride (KLOR-CON M) extended release tablet 40 mEq  40 mEq Oral PRN Bertinonsus Travis, APRN - CNP        Or    potassium bicarb-citric acid (EFFER-K) effervescent tablet 40 mEq  40 mEq Oral PRN Bertinonsus Travis, APRN - CNP        Or    potassium chloride 10 mEq/100 mL IVPB (Peripheral Line)  10 mEq Intravenous PRN Elif Robles, APRN - CNP        magnesium sulfate 1 g in dextrose 5% 100 mL IVPB  1 g Intravenous PRN Rosaus Travis, APRN - CNP        famotidine (PEPCID) tablet 20 mg  20 mg Oral BID Rosaus Travis APRN - CNP        promethazine (PHENERGAN) tablet 12.5 mg  12.5 mg Oral Q6H PRN Rosaus Travis, APRN - CNP        Or    ondansetron [facility]) injection 4 mg  4 mg Intravenous Q6H PRN Elif Robles APRN - CNP        acetaminophen (TYLENOL) tablet 650 mg  650 mg Oral Q6H PRN Rosaus Travis, APRN - CNP   650 mg at 01/15/21 0516    Or    acetaminophen (TYLENOL) suppository 650 mg  650 mg Rectal Q6H PRN Elif Robles APRN - CNP        magnesium hydroxide (MILK OF MAGNESIA) 400 MG/5ML suspension 30 mL  30 mL Oral Daily PRN Elif Robles, APRN - CNP        metoprolol tartrate (LOPRESSOR) tablet 25 mg  25 mg Oral BID Bertinonsus Travis, APRN - CNP   25 mg at 01/15/21 0103    atorvastatin (LIPITOR) tablet 80 mg  80 mg Oral Nightly Bertinonsus Travis APRN - CNP Physical activity     Days per week: Not on file     Minutes per session: Not on file    Stress: Not on file   Relationships    Social connections     Talks on phone: Not on file     Gets together: Not on file     Attends Mosque service: Not on file     Active member of club or organization: Not on file     Attends meetings of clubs or organizations: Not on file     Relationship status: Not on file    Intimate partner violence     Fear of current or ex partner: Not on file     Emotionally abused: Not on file     Physically abused: Not on file     Forced sexual activity: Not on file   Other Topics Concern    Not on file   Social History Narrative    Not on file     Family History:   Family History   Problem Relation Age of Onset    Heart Attack Father     Heart Attack Paternal Uncle     Heart Attack Paternal Grandmother        · REVIEW OF SYSTEMS   Patient denies any headache. No change in his vision. He works as a  and he tolerates activities well. No fever chills or cough. Denies any abdominal pain. No nausea or vomiting or diarrhea. Denies any bleeding. No flank pain or back pain. PHYSICAL EXAM:    Vitals:    VITALS:  /89   Pulse 85   Temp 98.9 °F (37.2 °C) (Oral)   Resp 17   Ht 5' 8\" (1.727 m)   Wt 205 lb (93 kg)   SpO2 96%   BMI 31.17 kg/m²   24HR INTAKE/OUTPUT:  No intake or output data in the 24 hours ending 01/15/21 0907    Physical exam was deferred due to COVID-19 isolation status and due to national shortage of PPE. DATA:   ECG: Patient had multiple EKGs and all showed normal sinus rhythm without ST-T changes.   ECHO: Date:   Not performed to date  Stress Test:  Not performed to date  Angiography:  Not performed to date    Cardiology Labs:  Recent Labs     01/14/21  2245 01/15/21  0038 01/15/21  0502   MYOGLOBIN 146* 128*  --    TROPONINT NOT REPORTED NOT REPORTED NOT REPORTED     Warfarin PT/INR:  Lab Results   Component Value Date    PROTIME 13.3 01/15/2021 INR 1.0 01/15/2021     CBC:  Lab Results   Component Value Date    WBC 13.0 01/15/2021    RBC 4.90 01/15/2021    HGB 14.4 01/15/2021    HCT 45.0 01/15/2021    MCV 91.8 01/15/2021    MCH 29.4 01/15/2021    MCHC 32.0 01/15/2021    RDW 13.4 01/15/2021     01/15/2021    MPV 9.5 01/15/2021     CMP:  Lab Results   Component Value Date     01/15/2021    K 4.0 01/15/2021     01/15/2021    CO2 25 01/15/2021    BUN 15 01/15/2021    CREATININE 0.92 01/15/2021    GFRAA >60 01/15/2021    LABGLOM >60 01/15/2021    GLUCOSE 126 01/15/2021    GLUCOSE 96 01/14/2021    CALCIUM 9.1 01/15/2021     Magnesium:    Lab Results   Component Value Date    MG 1.8 01/15/2021     PTT:    Lab Results   Component Value Date    APTT 87.6 01/15/2021     TSH:  No results found for: TSH  BMP:  Lab Results   Component Value Date     01/15/2021    K 4.0 01/15/2021     01/15/2021    CO2 25 01/15/2021    BUN 15 01/15/2021    LABALBU 3.8 01/15/2021    LABALBU 4.4 01/14/2021    CREATININE 0.92 01/15/2021    CALCIUM 9.1 01/15/2021    GFRAA >60 01/15/2021    LABGLOM >60 01/15/2021    GLUCOSE 126 01/15/2021    GLUCOSE 96 01/14/2021     LIVER PROFILE:  Recent Labs     01/14/21  1421 01/15/21  0502   * 164*   ALT 35 38   LABALBU 4.4 3.8   ALKPHOS 69 70   BILITOT 0.6 1.23*   BILIDIR 0.2  --    PROT 7.5 7.2   ALBUMIN  --  NOT REPORTED     FLP:    Lab Results   Component Value Date    CHOL 185 07/11/2019    TRIG 78 07/11/2019             IMPRESSION  1. Acute non-ST elevation MI  2. No other reported medical history  Patient Active Problem List   Diagnosis    Trigger finger of left thumb    BMI 32.0-32.9,adult    NSTEMI (non-ST elevated myocardial infarction) ([facility] [address]           RECOMMENDATIONS:     Continue current medications  Management plan was discussed with patient     We will continue medical therapy. I discussed with him the benefit from invasive cardiac work-up.   I explained the procedure to him in full details with possible side effects such as but not limited to bleeding, hematoma, allergy, renal failure, CVA, emergency surgery, extended MI, CHF and arrhythmia. He understood and agreed to proceed. We checked with the [facility] protocol regarding COVID-19 infected patients for cardiac catheterization and it appears that they are ready to do so. Further recommendation depending on the findings on the cardiac catheterization. Thank you for consultation. Discussed with the admitting physician and the patient's nurse.       Electronically signed by Vazquez Pimentel MD on 1/15/2021 at 9:07 AM     CC: Savanna Dominique MD

## 2021-01-15 NOTE — ED NOTES
Call placed to Dr Mayra Mosher @ [phone] via answering service. Dr Mayra Mosher is already on case, from [facility].  Disregard consult to Dr Jonah Dean ([address] [place] [address] for Magomarissa Monson  01/15/21 2400

## 2021-01-15 NOTE — ED NOTES
Bed: 24  Expected date:   Expected time:   Means of arrival:   Comments:  DESHAWN [facility] Julius  01/14/21 2720

## 2021-01-15 NOTE — H&P
[facility]  Office: [address], DO, Elgin Melara, DO, Darline Bautistao, DO, Rebeka Dave Blood, DO, Mayito Jason MD, Judith Deng MD, Darrin Hanson MD, David Pascual MD, David Davis MD, Ambrosio Flores MD, Malu Massey MD, Vijaya Quiroga MD, Jasmine Yeung MD, Keiko Vargas, DO, Edda Bishop MD, Omero Abrams MD, Gilberto Huerta DO, lAetha Lim MD,  Juan Ramon Oliva DO, Zhanna Michael MD, Kusum Self MD, Noah Berg, [facility], [facility] Jason, CNP, Leo Alonso, CNP, Beth Turcios, CNS, Christo Frederick, [facility], Raynell Councilman, CNP, Arthur Smith, CNP, Rogelio Steve, CNP, Clara Guardado, CNP, Hortencia Chadwick PA-C, Cordell Deal, [facility], Sindy Mitchell, CNP, Billie Granados, CNP, Nancy Stauffer, CNP, Lambert Villalba, CNP, Young Olivo, [facility]    HISTORY AND PHYSICAL EXAMINATION            Date:   1/15/2021  Patient name:  Alicia Troncoso  Date of admission:  1/14/2021 10:13 PM  MRN:   [id number]  Account:  [de-identified]  YOB: 1960  PCP:    Merna Abarca MD  Room:   [facility] BERTA/BERTA  Code Status:    Full Code    Chief Complaint:     Chief Complaint   Patient presents with    Chest Pain     [facility] transfer, NSTEMI, covid +       History Obtained From:     patient    History of Present Illness:     Alicia Troncoso is a 64 y.o. Non-/non  male who presents with Chest Pain [facility] transfer, NSTEMI, covid +)   and is admitted to the hospital for the management of NSTEMI (non-ST elevated myocardial infarction) ([facility] [address] Patient is admitted through the emergency department following an ER to ER transfer. Patient reports that over the past 36 hours she has been experiencing an uneasy feeling and a sense of impending doom. Patient also developed chest heaviness with shortness of breath and anxiety in the middle of the night 18 hours ago.   Patient reported to an outlying urgent care yesterday where they were referred him to the emergency department for further evaluation. Patient was found to be experiencing symptoms consistent with acute coronary syndrome. Standard evaluation was completed and patient was found to have no ST segment changes but he was found to have an elevated troponin. Incidentally the patient was also found to be Covid positive. Patient has had no symptoms consistent with novel coronavirus infection. Patient denies shortness of breath, cough, fatigue, fever. Patient reports that he feels his normal active self with the exception of his chest pain and general state of uneasiness. Patient denies any diaphoresis or near syncopal symptoms. Patient denies any known exposures to anyone with novel coronavirus. Patient also denies any symptoms of systemic infection for the past 3 months. Past Medical History:     Past Medical History:   Diagnosis Date    Hypertension     RADHA (obstructive sleep apnea)         Past Surgical History:     No past surgical history on file. Medications Prior to Admission:     Prior to Admission medications    Medication Sig Start Date End Date Taking? Authorizing Provider   cephALEXin (KEFLEX) 500 MG capsule Take 500 mg by mouth 4 times daily    Historical Provider, MD   meloxicam (MOBIC) 15 MG tablet Take 1 tablet by mouth daily 7/9/19   Alejandro Trinh MD   fexofenadine (ALLEGRA ALLERGY) 60 MG tablet Take 60 mg by mouth daily    Historical Provider, MD        Allergies:     Penicillins    Social History:     Tobacco:    reports that he has never smoked. He has never used smokeless tobacco.  Alcohol:      reports current alcohol use. Drug Use:  reports no history of drug use. Family History:     Family History   Problem Relation Age of Onset    Heart Attack Father     Heart Attack Paternal Uncle     Heart Attack Paternal Grandmother        Review of Systems:     Positive and Negative as described in HPI.     CONSTITUTIONAL:  negative for fevers, chills, sweats, fatigue, weight loss  HEENT:  negative for vision, hearing changes, runny nose, throat pain  RESPIRATORY:  negative for shortness of breath, cough, congestion, wheezing  CARDIOVASCULAR: Endorses chest heaviness/pain located in the left shoulder and anterior chest.  Negative for palpitations  GASTROINTESTINAL:  negative for nausea, vomiting, diarrhea, constipation, change in bowel habits, abdominal pain   GENITOURINARY:  negative for difficulty of urination, burning with urination, frequency   INTEGUMENT:  negative for rash, skin lesions, easy bruising   HEMATOLOGIC/LYMPHATIC:  negative for swelling/edema   ALLERGIC/IMMUNOLOGIC:  negative for urticaria , itching  ENDOCRINE:  negative increase in drinking, increase in urination, hot or cold intolerance  MUSCULOSKELETAL:  negative joint pains, muscle aches, swelling of joints  NEUROLOGICAL:  negative for headaches, dizziness, lightheadedness, numbness, pain, tingling extremities  BEHAVIOR/PSYCH:  negative for depression, anxiety    Physical Exam:   /89   Pulse 84   Temp 98.9 °F (37.2 °C) (Oral)   Resp 16   Ht 5' 8\" (1.727 m)   Wt 205 lb (93 kg)   SpO2 95%   BMI 31.17 kg/m²   Temp (24hrs), Av.9 °F (37.2 °C), Min:98.9 °F (37.2 °C), Max:98.9 °F (37.2 °C)    No results for input(s): POCGLU in the last 72 hours.   No intake or output data in the 24 hours ending 01/15/21 1110    General Appearance:  alert, well appearing, and in no acute distress  Mental status: oriented to person, place, and time  Head:  normocephalic, atraumatic  Eye: no icterus, redness, pupils equal and reactive, extraocular eye movements intact, conjunctiva clear  Ear: normal external ear, no discharge, hearing intact  Nose:  no drainage noted  Mouth: mucous membranes moist  Neck: supple, no carotid bruits, thyroid not palpable  Lungs: Bilateral equal air entry, clear to auscultation, no wheezing, rales or rhonchi, normal effort  Cardiovascular: normal rate, regular rhythm, no murmur, gallop, rub.   Abdomen: Soft, nontender, nondistended, normal bowel sounds, no hepatomegaly or splenomegaly  Neurologic: There are no new focal motor or sensory deficits, normal muscle tone and bulk, no abnormal sensation, normal speech, cranial nerves II through XII grossly intact  Skin: No gross lesions, rashes, bruising or bleeding on exposed skin area  Extremities:  peripheral pulses palpable, no pedal edema or calf pain with palpation  Psych: normal affect     Investigations:      Laboratory Testing:  Recent Results (from the past 24 hour(s))   Protime/INR & PTT    Collection Time: 01/14/21  2:21 PM   Result Value Ref Range    Protime 10.5 9.54 - 10.90 SECONDS    INR 0.97 0.86 - 1.15   APTT    Collection Time: 01/14/21  2:21 PM   Result Value Ref Range    PTT 29.5 24.49 - 30.3 SECONDS   D-Dimer, Quantitative    Collection Time: 01/14/21  2:21 PM   Result Value Ref Range    D-Dimer, Quant 0.39 <0.50 mg/L FEU   Basic Metabolic Panel    Collection Time: 01/14/21  2:21 PM   Result Value Ref Range    Glucose 96 70 - 99 mg/dL    BUN 14 7 - 18 mg/dL    CREATININE 1.03 0.72 - 1.25 mg/dL    Gfr Calculated > 60 ml/min/1.7    Sodium 144 136 - 145 mmol/L    Potassium 3.9 3.5 - 5.1 mmol/L    Chloride 108 (H) 98 - 107 mmol/L    CO2 27 22 - 29 mmol/L    Anion Gap 9 5 - 13 mEq/L    Calcium 9.0 8.4 - 10.2 mg/dL   Hepatic Function Panel    Collection Time: 01/14/21  2:21 PM   Result Value Ref Range    Total Protein 7.5 6.4 - 8.2 g/dL    Albumin 4.4 3.5 - 5.2 g/dL    Total Bilirubin 0.6 0.0 - 1.0 mg/dL    Bilirubin, Direct 0.2 0.1 - 0.5 mg/dL     (H) 5 - 34 U/L    ALT 35 0 - 55 U/L    Alkaline Phosphatase 69 40 - 150 U/L   Troponin    Collection Time: 01/14/21  2:21 PM   Result Value Ref Range    Troponin I 7.867 (HH) 0.010 - 0.04 ng/mL   Lipase    Collection Time: 01/14/21  2:21 PM   Result Value Ref Range    Lipase 29 8 - 78 U/L   COVID-19    Collection Time: 01/14/21  3:50 PM   Result Value Ref Range SARS-CoV-2 ID NOW COVID-19COVID-19 RESULT INTERPRETATION:    Troponin    Collection Time: 01/14/21  4:31 PM   Result Value Ref Range    Troponin I 11.038 (HH) 0.010 - 0.04 ng/mL   EKG 12 Lead    Collection Time: 01/14/21 10:17 PM   Result Value Ref Range    Ventricular Rate 74 BPM    Atrial Rate 74 BPM    P-R Interval 142 ms    QRS Duration 100 ms    Q-T Interval 376 ms    QTc Calculation (Bazett) 417 ms    P Axis 2 degrees    R Axis 36 degrees    T Axis 33 degrees   TROP/MYOGLOBIN    Collection Time: 01/14/21 10:45 PM   Result Value Ref Range    Troponin, High Sensitivity 886 (HH) 0 - 22 ng/L    Troponin T NOT REPORTED <0.03 ng/mL    Troponin Interp NOT REPORTED     Myoglobin 146 (H) 28 - 72 ng/mL   Protime-INR    Collection Time: 01/14/21 10:45 PM   Result Value Ref Range    Protime 12.6 11.5 - 14.2 sec    INR 1.0    APTT    Collection Time: 01/14/21 10:45 PM   Result Value Ref Range    PTT 30.5 23.9 - 33.8 sec   CBC    Collection Time: 01/14/21 10:45 PM   Result Value Ref Range    WBC 13.8 (H) 3.5 - 11.3 k/uL    RBC 4.93 4.21 - 5.77 m/uL    Hemoglobin 14.6 13.0 - 17.0 g/dL    Hematocrit 45.9 40.7 - 50.3 %    MCV 93.1 82.6 - 102.9 fL    MCH 29.6 25.2 - 33.5 pg    MCHC 31.8 28.4 - 34.8 g/dL    RDW 13.2 11.8 - 14.4 %    Platelets 297 654 - 992 k/uL    MPV 9.6 8.1 - 13.5 fL    NRBC Automated 0.0 0.0 per 100 WBC   Anti-Xa Unfract Heparin    Collection Time: 01/14/21 10:45 PM   Result Value Ref Range    Anti-XA Unfrac Heparin <0.10 (L) 0.30 - 0.70 IU/L   TROP/MYOGLOBIN    Collection Time: 01/15/21 12:38 AM   Result Value Ref Range    Troponin, High Sensitivity 952 (HH) 0 - 22 ng/L    Troponin T NOT REPORTED <0.03 ng/mL    Troponin Interp NOT REPORTED     Myoglobin 128 (H) 28 - 72 ng/mL   CBC    Collection Time: 01/15/21 12:38 AM   Result Value Ref Range    WBC 13.8 (H) 3.5 - 11.3 k/uL    RBC 4.81 4.21 - 5.77 m/uL    Hemoglobin 14.2 13.0 - 17.0 g/dL    Hematocrit 44.2 40.7 - 50.3 %    MCV 91.9 82.6 - 102.9 fL    MCH 29.5 25.2 - 33.5 pg    MCHC 32.1 28.4 - 34.8 g/dL    RDW 13.4 11.8 - 14.4 %    Platelets 990 002 - 593 k/uL    MPV 9.6 8.1 - 13.5 fL    NRBC Automated 0.0 0.0 per 100 WBC   APTT    Collection Time: 01/15/21 12:38 AM   Result Value Ref Range    PTT 87.6 (H) 23.9 - 33.8 sec   Protime-INR    Collection Time: 01/15/21 12:38 AM   Result Value Ref Range    Protime 13.3 11.5 - 14.2 sec    INR 1.0    HEPARIN LEVEL/ANTI-XA    Collection Time: 01/15/21  5:02 AM   Result Value Ref Range    Anti-XA Unfrac Heparin 0.21 (L) 0.30 - 0.70 IU/L   Troponin    Collection Time: 01/15/21  5:02 AM   Result Value Ref Range    Troponin, High Sensitivity 1,040 (HH) 0 - 22 ng/L    Troponin T NOT REPORTED <0.03 ng/mL    Troponin Interp NOT REPORTED    Brain Natriuretic Peptide    Collection Time: 01/15/21  5:02 AM   Result Value Ref Range    Pro- (H) <300 pg/mL    BNP Interpretation Pro-BNP Reference Range:    CBC    Collection Time: 01/15/21  5:02 AM   Result Value Ref Range    WBC 13.0 (H) 3.5 - 11.3 k/uL    RBC 4.90 4.21 - 5.77 m/uL    Hemoglobin 14.4 13.0 - 17.0 g/dL    Hematocrit 45.0 40.7 - 50.3 %    MCV 91.8 82.6 - 102.9 fL    MCH 29.4 25.2 - 33.5 pg    MCHC 32.0 28.4 - 34.8 g/dL    RDW 13.4 11.8 - 14.4 %    Platelets 379 380 - 652 k/uL    MPV 9.5 8.1 - 13.5 fL    NRBC Automated 0.0 0.0 per 100 WBC   Comprehensive Metabolic Panel w/ Reflex to MG    Collection Time: 01/15/21  5:02 AM   Result Value Ref Range    Glucose 126 (H) 70 - 99 mg/dL    BUN 15 6 - 20 mg/dL    CREATININE 0.92 0.70 - 1.20 mg/dL    Bun/Cre Ratio 16 9 - 20    Calcium 9.1 8.6 - 10.4 mg/dL    Sodium 137 135 - 144 mmol/L    Potassium 4.0 3.7 - 5.3 mmol/L    Chloride 103 98 - 107 mmol/L    CO2 25 20 - 31 mmol/L    Anion Gap 9 9 - 17 mmol/L    Alkaline Phosphatase 70 40 - 129 U/L    ALT 38 5 - 41 U/L     (H) <40 U/L    Total Bilirubin 1.23 (H) 0.3 - 1.2 mg/dL    Total Protein 7.2 6.4 - 8.3 g/dL    Alb 3.8 3.5 - 5.2 g/dL    Albumin/Globulin Ratio NOT REPORTED 1.0 - 2.5    GFR Non-African American >60 >60 mL/min    GFR African American >60 >60 mL/min    GFR Comment          GFR Staging NOT REPORTED    Magnesium    Collection Time: 01/15/21  5:02 AM   Result Value Ref Range    Magnesium 1.8 1.6 - 2.6 mg/dL   EKG 12 lead    Collection Time: 01/15/21  5:20 AM   Result Value Ref Range    Ventricular Rate 75 BPM    Atrial Rate 75 BPM    P-R Interval 146 ms    QRS Duration 104 ms    Q-T Interval 382 ms    QTc Calculation (Bazett) 426 ms    P Axis 21 degrees    R Axis 35 degrees    T Axis 42 degrees       Imaging/Diagnostics:  No results found. Assessment :      Hospital Problems           Last Modified POA    * (Principal) NSTEMI (non-ST elevated myocardial infarction) ([facility] [address] 1/15/2021 Yes    Obesity (BMI 30-39.9) 1/15/2021 Yes    COVID-19 1/15/2021 Yes    Essential hypertension 1/15/2021 Yes    RADHA (obstructive sleep apnea) 1/15/2021 Yes          Plan:     Patient status inpatient in the Progressive Unit/Step down    1. Continue heparin and nitro drip  2. Cardiology consultation, anticipate heart catheterization today  3. Draw inflammatory markers including CRP, ferritin, D-dimer, LDH  4. Recheck molecular PCR for COVID-19 as well as antigen testing to test for recovered disease  5. Aspirin, statin, beta-blocker for likely ACS and hypertension  6. CPAP at bedtime for RADHA  7. Education on the need for diet lifestyle modifications    Consultations:   IP CONSULT TO HOSPITALIST  IP CONSULT TO CARDIOLOGY    Patient is admitted as inpatient status because of co-morbidities listed above, severity of signs and symptoms as outlined, requirement for current medical therapies and most importantly because of direct risk to patient if care not provided in a hospital setting. Expected length of stay > 48 hours.     MARIO Martinez NP  1/15/2021  11:10 AM    Copy sent to Dr. Fernando Sandhu MD

## 2021-01-16 VITALS
DIASTOLIC BLOOD PRESSURE: 84 MMHG | WEIGHT: 213.56 LBS | OXYGEN SATURATION: 97 % | BODY MASS INDEX: 32.37 KG/M2 | HEART RATE: 84 BPM | SYSTOLIC BLOOD PRESSURE: 132 MMHG | HEIGHT: 68 IN | RESPIRATION RATE: 16 BRPM | TEMPERATURE: 97.9 F

## 2021-01-16 PROBLEM — I21.4 NON-ST ELEVATION MI (NSTEMI) (HCC): Status: ACTIVE | Noted: 2021-01-16

## 2021-01-16 LAB
EKG ATRIAL RATE: 74 BPM
EKG ATRIAL RATE: 75 BPM
EKG P AXIS: 2 DEGREES
EKG P AXIS: 21 DEGREES
EKG P-R INTERVAL: 142 MS
EKG P-R INTERVAL: 146 MS
EKG Q-T INTERVAL: 376 MS
EKG Q-T INTERVAL: 382 MS
EKG QRS DURATION: 100 MS
EKG QRS DURATION: 104 MS
EKG QTC CALCULATION (BAZETT): 417 MS
EKG QTC CALCULATION (BAZETT): 426 MS
EKG R AXIS: 35 DEGREES
EKG R AXIS: 36 DEGREES
EKG T AXIS: 33 DEGREES
EKG T AXIS: 42 DEGREES
EKG VENTRICULAR RATE: 74 BPM
EKG VENTRICULAR RATE: 75 BPM

## 2021-01-16 PROCEDURE — C1725 CATH, TRANSLUMIN NON-LASER: HCPCS

## 2021-01-16 PROCEDURE — 99238 HOSP IP/OBS DSCHRG MGMT 30/<: CPT | Performed by: INTERNAL MEDICINE

## 2021-01-16 PROCEDURE — 93010 ELECTROCARDIOGRAM REPORT: CPT | Performed by: INTERNAL MEDICINE

## 2021-01-16 PROCEDURE — 2580000003 HC RX 258: Performed by: INTERNAL MEDICINE

## 2021-01-16 PROCEDURE — G0378 HOSPITAL OBSERVATION PER HR: HCPCS

## 2021-01-16 PROCEDURE — 6370000000 HC RX 637 (ALT 250 FOR IP): Performed by: INTERNAL MEDICINE

## 2021-01-16 PROCEDURE — C1760 CLOSURE DEV, VASC: HCPCS

## 2021-01-16 PROCEDURE — C1894 INTRO/SHEATH, NON-LASER: HCPCS

## 2021-01-16 PROCEDURE — 2709999900 HC NON-CHARGEABLE SUPPLY

## 2021-01-16 RX ORDER — NITROGLYCERIN 0.4 MG/1
TABLET SUBLINGUAL
Qty: 25 TABLET | Refills: 3 | Status: SHIPPED | OUTPATIENT
Start: 2021-01-16

## 2021-01-16 RX ORDER — ASPIRIN 81 MG/1
81 TABLET, CHEWABLE ORAL DAILY
Qty: 30 TABLET | Refills: 3 | Status: SHIPPED | OUTPATIENT
Start: 2021-01-17

## 2021-01-16 RX ORDER — ATORVASTATIN CALCIUM 80 MG/1
80 TABLET, FILM COATED ORAL NIGHTLY
Qty: 30 TABLET | Refills: 3 | Status: SHIPPED | OUTPATIENT
Start: 2021-01-16 | End: 2021-02-25

## 2021-01-16 RX ADMIN — SODIUM CHLORIDE, PRESERVATIVE FREE 10 ML: 5 INJECTION INTRAVENOUS at 09:17

## 2021-01-16 RX ADMIN — ASPIRIN 81 MG: 81 TABLET, CHEWABLE ORAL at 09:17

## 2021-01-16 RX ADMIN — METOPROLOL TARTRATE 25 MG: 25 TABLET, FILM COATED ORAL at 09:17

## 2021-01-16 RX ADMIN — CETIRIZINE HYDROCHLORIDE 10 MG: 10 TABLET, FILM COATED ORAL at 09:16

## 2021-01-16 RX ADMIN — Medication 10 ML: at 09:17

## 2021-01-16 RX ADMIN — ACETAMINOPHEN 650 MG: 325 TABLET ORAL at 00:48

## 2021-01-16 RX ADMIN — FAMOTIDINE 20 MG: 20 TABLET, FILM COATED ORAL at 09:17

## 2021-01-16 ASSESSMENT — PAIN SCALES - GENERAL
PAINLEVEL_OUTOF10: 0
PAINLEVEL_OUTOF10: 0

## 2021-01-16 NOTE — PROGRESS NOTES
Discharge paperwork completed with patient at bedside. Patient understanding of discharge medications, including side effects. Patient belongings bagged and with patient.

## 2021-01-16 NOTE — PROGRESS NOTES
Pt admitted to room 1010 from ICU. VS taken and nitial assessment completed. Pt oriented to room, call light and personal items within reach. See chart for more details.

## 2021-01-16 NOTE — PROGRESS NOTES
Section of Cardiology  Progress Note      Date:  1/16/2021  Patient: Kristy Hoover  Admission:  1/14/2021 10:13 PM  Ad[facility]: [facility] [I[facility]]  NSTEMI (non-ST elevated myocardial infarction) ([facility] [I21.4]  Age:  64 y.o., 1960     LOS: 0 days     Reason for evaluation:   previous M. I.      SUBJECTIVE:     The patient was seen and examined. Notes and labs reviewed. There were not complications over night. Patient recovered well. No groin hematoma or bleeding. He is ambulating in the room. I spoke to him over the phone. Denies chest pain or shortness of breath. Does not feel fatigued. He is tolerating medications and no reports of side effects. He was tested negative for swab for COVID-19 today. 2 days ago was positive. The antibodies were positive as well. OBJECTIVE:    Telemetry: Sinus  /78   Pulse 96   Temp 100.4 °F (38 °C) (Oral)   Resp 16   Ht 5' 8\" (1.727 m)   Wt 213 lb 9 oz (96.9 kg)   SpO2 95%   BMI 32.47 kg/m²     Intake/Output Summary (Last 24 hours) at 1/16/2021 1141  Last data filed at 1/15/2021 1900  Gross per 24 hour   Intake 1400 ml   Output 1150 ml   Net 250 ml       EXAM:   Physical exam was deferred due to COVID-19 isolation status and due to shortage of PPE. Current Inpatient Medications:   cetirizine  10 mg Oral Daily    sodium chloride flush  10 mL Intravenous 2 times per day    famotidine  20 mg Oral BID    metoprolol tartrate  25 mg Oral BID    atorvastatin  80 mg Oral Nightly    sodium chloride flush  10 mL Intravenous 2 times per day    aspirin  81 mg Oral Daily       IV Infusions (if any):   nitroGLYCERIN 10 mcg/min (01/14/21 5196)       Diagnostics:   EKG: . ECHO: not obtained. Ejection fraction: %  Stress Test: not obtained. Cardiac Angiography: reviewed.     Labs:   CBC:   Recent Labs     01/15/21  0038 01/15/21  0502   WBC 13.8* 13.0*   HGB 14.2 14.4   HCT 44.2 45.0    266     BMP:   Recent Labs     01/14/21  1421 01/15/21  0502    137   K 3.9 4.0   CO2 27 25   BUN 14 15   CREATININE 1.03 0.92   LABGLOM > 60 >60   GLUCOSE 96 126*     No results found for: BNP  PT/INR:   Recent Labs     01/14/21  2245 01/15/21  0038   PROTIME 12.6 13.3   INR 1.0 1.0     APTT:  Recent Labs     01/14/21  2245 01/15/21  0038   APTT 30.5 87.6*     CARDIAC ENZYMES:  Recent Labs     01/14/21  2245 01/15/21  0038 01/15/21  0502   TROPONINT NOT REPORTED NOT REPORTED NOT REPORTED     FASTING LIPID PANEL:  Lab Results   Component Value Date    HDL 62 01/15/2021    LDLCALC 112.4 07/11/2019    TRIG 85 01/15/2021     LIVER PROFILE:  Recent Labs     01/14/21  1421 01/15/21  0502   * 164*   ALT 35 38   LABALBU 4.4 3.8       ASSESSMENT:  1. Acute non-ST elevation MI  2. No other reported medical history  3. Single-vessel coronary disease with occluded circumflex coronary artery  Patient Active Problem List   Diagnosis    Trigger finger of left thumb    Obesity (BMI 30-39. 9)    NSTEMI (non-ST elevated myocardial infarction) ([facility] [address]    COVID-19    Essential hypertension    RADHA (obstructive sleep apnea)    Acute respiratory failure due to COVID-19 [facility])       PLAN:    1. I have explained the cardiac status to the patient over the phone. 2. From cardiac perspective he can be discharged on aspirin, statin and Lopressor  3. Recommend cardiac rehab in the future  4. He can follow[place]up up with a cardiologist in [place] hometown. Please see orders. Discussed with patient and other admitting physician and nursing.     Dioni Love MD

## 2021-01-16 NOTE — PLAN OF CARE
Problem: Body Temperature -  Risk of, Imbalanced  Goal: Ability to maintain a body temperature within defined limits  1/16/2021 0103 by Shad Castillo RN  Outcome: Ongoing     Problem:  Body Temperature -  Risk of, Imbalanced  Goal: Complications related to the disease process, condition or treatment will be avoided or minimized  Outcome: Ongoing

## 2021-01-17 NOTE — DISCHARGE SUMMARY
[facility]  Office: [address], DO, Tashia Alert, DO, Tung Pickett, DO, Claunorma Cantusten Blood, DO, Candy Hernandez MD, Caroline Fajardo MD, Negro Gaffney MD, Ryan Mcguire MD, Adele Benitez MD, Nichelle Kuhn MD, Dimas Denise MD, Mike Hernandez MD, [facility] Kasandra Gomes MD, Maame Silva DO, Osman Oliveira MD, Carlos Dubose MD, Sofia Crowley DO, Lady Edd MD,  Darshana Sosa DO, Maria Alejandra Atwood MD, Juan Horne MD, Corey Martinez, [facility], [facility], CNP, Rhonda Valdes, CNP, Bozena Argueta, CNS, Lynda Rogers, CNP, Narcisa Alejo, CNP, Pam Paula, CNP, Genoveva Sharma, CNP, Jony Leon, CNP, Chaz Bonner PA-C, Rajni Hill, [facility], Jacob Urena, CNP, Abhi Butt, CNP, Nathalie Kelly, CNP, Cirilo Fish, CNP, Gabby Nicholass, [facility]    Discharge Summary     Patient ID: Kelsea Chapa  :  1960   MRN: [id number]     ACCOUNT:  [de-identified]   Patient's PCP: Joselyn Barakat MD  Admit Date: 2021   Discharge Date: 21    Length of Stay: 1  Code Status:  Prior  Admitting Physician: Carlos Dubose MD  Discharge Physician: Carlos Dubose MD     Active Discharge Diagnoses:     Hospital Problem Lists:  Principal Problem:    NSTEMI (non-ST elevated myocardial infarction) [facility])  Active Problems:    Obesity (BMI 30-39. 9)    COVID-19    Essential hypertension    RADHA (obstructive sleep apnea)    Non-ST [facility]  Resolved Problems:    [facility]      Admission Condition:  serious     Discharged Condition: stable    Hospital Stay:     Hospital Course:  Kelsea Chapa is a 64 y.o. male who was admitted for the management of   NSTEMI (non-ST elevated myocardial infarction) ([facility] [address] , presented to ER with [facility], NSTEMI, covid +)    Patient is admitted through the emergency department following an ER to ER transfer.   Patient reports that over the past 36 hours she has been experiencing an uneasy feeling and a sense of impending doom. Patient also developed chest heaviness with shortness of breath and anxiety in the middle of the night 18 hours ago. Patient reported to an [facility] urgent care yesterday where they were referred him to the emergency department for further evaluation. Patient was found to be experiencing symptoms consistent with acute coronary syndrome. Standard evaluation was completed and patient was found to have no ST segment changes but he was found to have an elevated troponin. Incidentally the patient was also found to be Covid positive. Patient has had no symptoms consistent with novel coronavirus infection. Patient denies shortness of breath, cough, fatigue, fever. Patient reports that he feels his normal active self with the exception of his chest pain and general state of uneasiness. Patient denies any diaphoresis or near syncopal symptoms.     Patient denies any known exposures to anyone with novel coronavirus. Patient also denies any symptoms of systemic infection for the past 3 months. S/p cath, plz refer to cardiology reports notes    FU with PCP and cardiology     Review of system:  Denies any nausea vomiting fever chills,  Denies any headaches or blurred vision,  Denies any chest pain   Denies any cough phlegm hemoptysis,  Denies any abdominal pain diarrhea constipation,  Denies any tingling tingling numbness weakness of arms or legs,   Skin no rash,    On examination,  Alert awake oriented x3,  S1-S2 present,  CTA bilateral,  Abdomen soft nontender nondistended bowel sounds present   Extremity no edema no calf tenderness,,  Skin no rash  CNS no focal neurological deficits           Significant therapeutic interventions:   1.  Acute non-ST elevation MI  2. S/p cath, medical management per cardiology   3.   Single-vessel coronary disease with occluded circumflex coronary artery  Significant Diagnostic Studies:   Labs / Micro:  CBC:   Lab Results   Component Value Date    WBC 13.0 01/15/2021    RBC 4.90 01/15/2021    HGB 14.4 01/15/2021    HCT 45.0 01/15/2021    MCV 91.8 01/15/2021    MCH 29.4 01/15/2021    MCHC 32.0 01/15/2021    RDW 13.4 01/15/2021     01/15/2021     BMP:    Lab Results   Component Value Date    GLUCOSE 126 01/15/2021    GLUCOSE 96 01/14/2021     01/15/2021    K 4.0 01/15/2021     01/15/2021    CO2 25 01/15/2021    ANIONGAP 9 01/15/2021    BUN 15 01/15/2021    CREATININE 0.92 01/15/2021    BUNCRER 16 01/15/2021    CALCIUM 9.1 01/15/2021    LABGLOM >60 01/15/2021    GFRAA >60 01/15/2021    GFR      01/15/2021    GFR NOT REPORTED 01/15/2021     HFP:    Lab Results   Component Value Date    PROT 7.2 01/15/2021     CMP:    Lab Results   Component Value Date    GLUCOSE 126 01/15/2021    GLUCOSE 96 01/14/2021     01/15/2021    K 4.0 01/15/2021     01/15/2021    CO2 25 01/15/2021    BUN 15 01/15/2021    CREATININE 0.92 01/15/2021    ANIONGAP 9 01/15/2021    ALKPHOS 70 01/15/2021    ALT 38 01/15/2021     01/15/2021    BILITOT 1.23 01/15/2021    LABALBU 3.8 01/15/2021    LABALBU 4.4 01/14/2021    ALBUMIN NOT REPORTED 01/15/2021    LABGLOM >60 01/15/2021    GFRAA >60 01/15/2021    GFR      01/15/2021    GFR NOT REPORTED 01/15/2021    PROT 7.2 01/15/2021    CALCIUM 9.1 01/15/2021     PT/INR:    Lab Results   Component Value Date    PROTIME 13.3 01/15/2021    INR 1.0 01/15/2021     PTT:   Lab Results   Component Value Date    APTT 87.6 01/15/2021     FLP:    Lab Results   Component Value Date    CHOL 165 01/15/2021    TRIG 85 01/15/2021    HDL 62 01/15/2021     U/A:  No results found for: Bobetta Barby, SPECGRAV, HGBUR, PHUR, PROTEINU, GLUCOSEU, KETUA, BILIRUBINUR, UROBILINOGEN, NITRU, LEUKOCYTESUR  TSH:  No results found for: TSH       Radiology:  No results found.     Consultations:    Consults:     Final Specialist Recommendations/Findings:   IP CONSULT and follow up. Electronically signed by   Jeronimo Kolb MD  1/17/2021  12:18 PM      Thank you Dr. Bailee Lozada MD for the opportunity to be involved in this patient's care.

## 2021-01-18 ENCOUNTER — CARE COORDINATION (OUTPATIENT)
Dept: CASE MANAGEMENT | Age: 61
End: 2021-01-18

## 2021-01-18 NOTE — CARE COORDINATION
[facility] Transitions Initial Follow Up Call- COVID risk follow up call      Call within 2 business days of discharge: Yes    Patient: Parmjit Lehman Patient : 1960   MRN: [id number]  Reason for Admission: NSTEMI, cath (right groin)   Discharge Date: 21 RARS: Readmission Risk Score: 8      Last Discharge [facility]       Complaint Diagnosis Description Type Department Provider    21 Chest Pain NSTEMI (non-ST elevated myocardial infarction) [facility]) ED to Hosp-Admission (Discharged) (ADMITTED) ERICA Denton MD; Loni Flood .. Spoke with: pt wife Sunrise     Call to pt wife (pt on another phone call). States he is doing \"okay\". States concern for him going upstairs (bath and shower upstairs). Writer advises mild activity and to monitor how he does with going up the stairs  States his groin looks good. States no complaints of CP, SOB. No fever/ chills. States they are waiting for med records to get back with them with the official negative covid test result from 1/15 at 1030. He did show + antibodies 1/15 and they are aware of that as well       Challenges to be reviewed by the provider   Additional needs identified to be addressed with provider No  none    Discussed COVID-19 related testing which was available at this time. Test results were negative. Patient informed of results, if available? Yes         Method of communication with provider : none    Advance Care Planning:   Does patient have an Advance Directive:  not on file; education provided. Was this a readmission? No  Patient stated reason for admission: heart   Patients top risk factors for readmission: lack of knowledge about disease and medical condition    Care Transition Nurse (CTN) contacted the family by telephone to perform post hospital discharge assessment. Verified name and  with family as identifiers. Provided introduction to self, and explanation of the CTN role.      CTN reviewed discharge instructions, medical action plan and red flags with family who verbalized understanding. Family given an opportunity to ask questions and does not have any further questions or concerns at this time. Were discharge instructions available to patient? Yes. Reviewed appropriate site of care based on symptoms and resources available to patient including: PCP, Specialist, When to call 911 and ctn. The family agrees to contact the PCP office for questions related to their healthcare. Medication reconciliation was performed with family, who verbalizes understanding of administration of home medications. Advised obtaining a 90-day supply of all daily and as-needed medications. Covid Risk Education    Patient has following risk factors of: no known risk factors. Education provided regarding infection prevention, and signs and symptoms of COVID-19 and when to seek medical attention with family who verbalized understanding. Discussed exposure protocols and quarantine From CDC: Are you at higher risk for severe illness?   and given an opportunity for questions and concerns. The family agrees to contact the COVID-19 hotline [phone] or PCP office for questions related to COVID-19. For more information on steps you can take to protect yourself, see CDC's How to Protect Yourself     Patient/family/caregiver given information for GetWell Loop and agrees to enroll no  Patient's preferred e-mail: declines  Patient's preferred phone number: declines    Discussed follow-up appointments. If no appointment was previously scheduled, appointment scheduling offered: Yes. Is follow up appointment scheduled within 7 days of discharge? Yes  Non-[facility] follow up appointment(s): Yan Frederick 1/21, PCP 1/20    Plan for follow-up call in 7-10 days based on severity of symptoms and risk factors. Plan for next call: symptom management-chest pain   CTN provided contact information for future needs.               Non-face-to-face services provided:  Scheduled appointment with PCP-1/20  Scheduled appointment with Sandro Isidro- cardiology   Obtained and reviewed discharge summary and/or continuity of care documents  Assessment and support for treatment adherence and medication management-confirms new meds    Care Transitions 24 Hour Call    Do you have any ongoing symptoms?: No  Do you have a copy of your discharge instructions?: Yes  Do you have all of your prescriptions and are they filled?: Yes  Have you been contacted by a [facility] Pharmacist?: No  Have you scheduled your follow up appointment?: Yes  How are you going to get to your appointment?: Car - drive self  Were you discharged with any Home Care or Post Acute Services: No  Do you feel like you have everything you need to keep you well at home?: Yes  Care Transitions Interventions         Follow Up  No future appointments.     Samaria Christie RN

## 2021-01-25 ENCOUNTER — TELEPHONE (OUTPATIENT)
Dept: FAMILY MEDICINE CLINIC | Age: 61
End: 2021-01-25

## 2021-01-25 ENCOUNTER — OFFICE VISIT (OUTPATIENT)
Dept: FAMILY MEDICINE CLINIC | Age: 61
End: 2021-01-25
Payer: COMMERCIAL

## 2021-01-25 VITALS
WEIGHT: 216 LBS | DIASTOLIC BLOOD PRESSURE: 80 MMHG | BODY MASS INDEX: 32.74 KG/M2 | SYSTOLIC BLOOD PRESSURE: 104 MMHG | HEIGHT: 68 IN | OXYGEN SATURATION: 97 % | HEART RATE: 77 BPM

## 2021-01-25 DIAGNOSIS — I25.10 CORONARY ARTERY DISEASE INVOLVING NATIVE CORONARY ARTERY OF NATIVE HEART WITHOUT ANGINA PECTORIS: ICD-10-CM

## 2021-01-25 DIAGNOSIS — I21.4 NON-ST ELEVATION MI (NSTEMI) (HCC): Primary | ICD-10-CM

## 2021-01-25 PROCEDURE — 99495 TRANSJ CARE MGMT MOD F2F 14D: CPT | Performed by: FAMILY MEDICINE

## 2021-01-25 PROCEDURE — 1111F DSCHRG MED/CURRENT MED MERGE: CPT | Performed by: FAMILY MEDICINE

## 2021-01-25 RX ORDER — CLOPIDOGREL BISULFATE 75 MG/1
75 TABLET ORAL DAILY
COMMUNITY
Start: 2021-01-20

## 2021-01-25 ASSESSMENT — PATIENT HEALTH QUESTIONNAIRE - PHQ9
SUM OF ALL RESPONSES TO PHQ QUESTIONS 1-9: 0
2. FEELING DOWN, DEPRESSED OR HOPELESS: 0

## 2021-01-25 ASSESSMENT — ENCOUNTER SYMPTOMS
ABDOMINAL PAIN: 0
COUGH: 0
SINUS PAIN: 0
SHORTNESS OF BREATH: 0
NAUSEA: 0
VOMITING: 0

## 2021-01-25 NOTE — PROGRESS NOTES
Pt went to [facility] on the 14th for chest pain, than was transferred to [facility] for further work up. D/c on 1/16/21    Sees cardiologist next week at [facility]. Vs to follow up   HENT: Negative for congestion and sinus pain. Eyes: Negative for visual disturbance. Respiratory: Negative for cough and shortness of breath. Cardiovascular: Negative for chest pain, palpitations and leg swelling. Gastrointestinal: Negative for abdominal pain, nausea and vomiting. Genitourinary: Negative for dysuria and frequency. Musculoskeletal: Negative for arthralgias and myalgias. Neurological: Negative for dizziness. Psychiatric/Behavioral: Negative for confusion and sleep disturbance. The patient is not nervous/anxious. Exam:     Physical Exam  Constitutional:       General: He is not in acute distress. Appearance: Normal appearance. He is not ill-appearing. HENT:      Head: Normocephalic. Eyes:      Conjunctiva/sclera: Conjunctivae normal.   Neck:      Musculoskeletal: Neck supple. No muscular tenderness. Vascular: No carotid bruit. Cardiovascular:      Rate and Rhythm: Normal rate and regular rhythm. Heart sounds: No murmur. Pulmonary:      Effort: Pulmonary effort is normal. No respiratory distress. Breath sounds: No wheezing, rhonchi or rales. Abdominal:      General: Bowel sounds are normal.   Musculoskeletal:         General: No swelling or deformity. Lymphadenopathy:      Cervical: No cervical adenopathy. Neurological:      Mental Status: He is alert. Psychiatric:         Thought Content: Thought content normal.         Judgment: Judgment normal.           Assessment:      Diagnosis Orders   1. Non-ST elevation MI (NSTEMI) ([facility] [address]     2.  Coronary artery disease involving native coronary artery of native heart without angina pectoris           Plan:         Diagnostic Tests performed in hospital: yes/reviewed: yes- cath    Disease Education:  Coronary Artery Disease Home Health Care : Other possible cardiac rehab.       Discussed with other providers: cardiology when needed        Note:  CPT Coding - 37125 (Moderate level - seen within 14 days)        Needs to have associated phone contact within 2 business days of inpatient discharge      Electronically signed by Jabari Cole MD on 1/25/2021 at 3:37 PM

## 2021-01-25 NOTE — TELEPHONE ENCOUNTER
[facility] Transitions Initial Follow Up Call    Outreach made within 2 business days of discharge: Yes    Patient: Katina Mohs Patient : 1960   MRN: [id number]  Reason for Admission: heart attack  Discharge Date: 21       Spoke with: Rosario Grimaldo    Discharge department/facility:  Vs    TCM Interactive Patient Contact:  Was patient able to fill all prescriptions: Yes  Was patient instructed to bring all medications to the follow-up visit: Yes  Is patient taking all medications as directed in the discharge summary?  Yes  Does patient understand their discharge instructions: Yes  Does patient have questions or concerns that need addressed prior to 7-14 day follow up office visit: no    Scheduled appointment with PCP within 7-14 days    Follow Up  Future Appointments   Date Time Provider Brooke Elizabeth   2021  3:00 PM MD JEREMY Carrillo [facility]   2021 11:00 AM STV CATH LAB  A STV[facility] CATH SUSIE Santos MA

## 2021-01-27 ENCOUNTER — CARE COORDINATION (OUTPATIENT)
Dept: CASE MANAGEMENT | Age: 61
End: 2021-01-27

## 2021-01-27 NOTE — CARE COORDINATION
[facility] Transitions Follow Up Call- 1st attempt final COVID risk follow up call       2021    Patient: Orvis Barley  Patient : 1960   MRN: [id number]  Reason for Admission: NSTEMI, cath (right groin)    Discharge Date: 21 RARS: Readmission Risk Score: 8         Attempted to reach patient for final transitional call.   VM left to return call to [phone]    Follow Up  Future Appointments   Date Time Provider Brooke Elizabeth   2021 11:00 AM STV CATH LAB RM A ST[facility] CATH [facility]   2021  3:40 PM [facility]       Megan Kinney RN

## 2021-01-28 ENCOUNTER — CARE COORDINATION (OUTPATIENT)
Dept: CASE MANAGEMENT | Age: 61
End: 2021-01-28

## 2021-01-28 NOTE — CARE COORDINATION
[facility] Transitions Follow Up Call- final COVID risk follow up call       2021    Patient: Jero Figueroa  Patient : 1960   MRN: [id number]  Reason for Admission: NSTEMI, cath (right groin)     Discharge Date: 21 RARS: Readmission Risk Score: 8         Spoke with: Mike Cevallos     Patient returned call. States he is doing \"well\"  States he has a heart cath  with Dr Alexandria Moulton (had followed up with Dr Cheri Barrett following discharge and Dr Alexandria Moulton will proceed with cath)  Denies CP, SOB  Discussed cardiac rehab after the cath     You Patient resolved from the Care Transitions episode on 2021  Discussed COVID-19 related testing which was available at this time. Test results were positive. Patient informed of results, if available? N/a     Patient/family has been provided the following resources and education related to COVID-19:                         Signs, symptoms and red flags related to COVID-19            CDC exposure and quarantine guidelines            [facility] exposure contact - [phone]            Contact for their local Department of Health                 Patient currently reports that the following symptoms have improved:  fatigue, shortness of breath and no new/worsening symptoms     No further outreach scheduled with this CTN/ACM. Episode of Care resolved. Patient has this CTN/ACM contact information if future needs arise. Care Transitions Subsequent and Final Call    Subsequent and Final Calls  Do you have any ongoing symptoms?: No  Have your medications changed?: No  Do you have any questions related to your medications?: No  Do you currently have any active services?: No  Do you have any needs or concerns that I can assist you with?: No  Identified Barriers: Lack of Education  Care Transitions Interventions  Other Interventions:            Follow Up  Future Appointments   Date Time Provider Brooke Elizabeth   2021 11:00 AM STV CATH LAB RM A ST[facility] CATH [facility] 7/26/2021  3:40 PM Anastasiia Carcamo MD [facility]       Gage Kramer RN

## 2021-02-01 ENCOUNTER — HOSPITAL ENCOUNTER (OUTPATIENT)
Dept: CARDIAC CATH/INVASIVE PROCEDURES | Age: 61
Discharge: HOME | End: 2021-02-01
Payer: COMMERCIAL

## 2021-02-01 VITALS
OXYGEN SATURATION: 96 % | TEMPERATURE: 98.3 F | WEIGHT: 213 LBS | BODY MASS INDEX: 32.28 KG/M2 | SYSTOLIC BLOOD PRESSURE: 118 MMHG | HEIGHT: 68 IN | DIASTOLIC BLOOD PRESSURE: 73 MMHG | HEART RATE: 73 BPM | RESPIRATION RATE: 16 BRPM

## 2021-02-01 LAB — ACTIVATED CLOTTING TIME: 262 SEC (ref 79–149)

## 2021-02-01 PROCEDURE — C1874 STENT, COATED/COV W/DEL SYS: HCPCS

## 2021-02-01 PROCEDURE — 92943 PRQ TRLUML REVSC CH OCC ANT: CPT | Performed by: INTERNAL MEDICINE

## 2021-02-01 PROCEDURE — 7100000001 HC PACU RECOVERY - ADDTL 15 MIN

## 2021-02-01 PROCEDURE — 6360000002 HC RX W HCPCS

## 2021-02-01 PROCEDURE — C1887 CATHETER, GUIDING: HCPCS

## 2021-02-01 PROCEDURE — 7100000000 HC PACU RECOVERY - FIRST 15 MIN

## 2021-02-01 PROCEDURE — 92921 HC PRQ CARDIAC ANGIO ADDL ART: CPT | Performed by: INTERNAL MEDICINE

## 2021-02-01 PROCEDURE — 2500000003 HC RX 250 WO HCPCS

## 2021-02-01 PROCEDURE — C1769 GUIDE WIRE: HCPCS

## 2021-02-01 PROCEDURE — C1725 CATH, TRANSLUMIN NON-LASER: HCPCS

## 2021-02-01 PROCEDURE — C1894 INTRO/SHEATH, NON-LASER: HCPCS

## 2021-02-01 PROCEDURE — 6360000004 HC RX CONTRAST MEDICATION

## 2021-02-01 PROCEDURE — 85347 COAGULATION TIME ACTIVATED: CPT

## 2021-02-01 PROCEDURE — C1760 CLOSURE DEV, VASC: HCPCS

## 2021-02-01 PROCEDURE — 6370000000 HC RX 637 (ALT 250 FOR IP)

## 2021-02-01 PROCEDURE — 2709999900 HC NON-CHARGEABLE SUPPLY

## 2021-02-01 PROCEDURE — 2580000003 HC RX 258: Performed by: INTERNAL MEDICINE

## 2021-02-01 RX ORDER — SODIUM CHLORIDE 0.9 % (FLUSH) 0.9 %
10 SYRINGE (ML) INJECTION PRN
Status: DISCONTINUED | OUTPATIENT
Start: 2021-02-01 | End: 2021-02-02 | Stop reason: HOSPADM

## 2021-02-01 RX ORDER — ACETAMINOPHEN 325 MG/1
650 TABLET ORAL EVERY 4 HOURS PRN
Status: DISCONTINUED | OUTPATIENT
Start: 2021-02-01 | End: 2021-02-02 | Stop reason: HOSPADM

## 2021-02-01 RX ORDER — SODIUM CHLORIDE 0.9 % (FLUSH) 0.9 %
10 SYRINGE (ML) INJECTION EVERY 12 HOURS SCHEDULED
Status: DISCONTINUED | OUTPATIENT
Start: 2021-02-01 | End: 2021-02-02 | Stop reason: HOSPADM

## 2021-02-01 RX ORDER — SODIUM CHLORIDE 9 MG/ML
INJECTION, SOLUTION INTRAVENOUS CONTINUOUS
Status: DISCONTINUED | OUTPATIENT
Start: 2021-02-01 | End: 2021-02-02 | Stop reason: HOSPADM

## 2021-02-01 RX ADMIN — SODIUM CHLORIDE: 9 INJECTION, SOLUTION INTRAVENOUS at 10:03

## 2021-02-01 NOTE — H&P
[facility]  History and Physical               Today's Date: 2/1/2021  Patient Name: Emily Rausch  Date of admission: 2/1/2021  9:27 AM  Patient's age: 64 y.o., 1960  Admission Dx: Abnormal result of other cardiovascular function study [R94.39]      CHIEF COMPLAINT:  No chief complaint on file. History Obtained From:  patient, electronic medical record      HISTORY OF PRESENT ILLNESS:      The patient is a 64 y.o.  male who presented to the hospital for elective cardiac cath. Patient was [facility] with [facility]STEMI and was evaluated by Dr. Kristy Travis. Patient had presented with symptoms of chest heaviness. He underwent cardiac catheterization that revealed single vessel CAD with occluded Lcx. No intervention was done and patient was planned for PCI of -Lcx. Past Medical History:   has a past medical history of CAD (coronary artery disease), Hypertension, and RADHA (obstructive sleep apnea). Past Surgical History:   has a past surgical history that includes Cardiac catheterization (01/15/2021) and Cardiac catheterization (02/01/2021). Home Medications:    Prior to Admission medications    Medication Sig Start Date End Date Taking? Authorizing Provider   clopidogrel (PLAVIX) 75 MG tablet  1/20/21  Yes Historical Provider, MD   aspirin 81 MG chewable tablet Take 1 tablet by mouth daily 1/17/21  Yes Alexa Winn MD   atorvastatin (LIPITOR) 80 MG tablet Take 1 tablet by mouth nightly 1/16/21  Yes Alexa Winn MD   metoprolol tartrate (LOPRESSOR) 25 MG tablet Take 1 tablet by mouth 2 times daily 1/16/21  Yes Alexa Winn MD   nitroGLYCERIN (NITROSTAT) 0.4 MG SL tablet up to max of 3 total doses.  If no relief after 1 dose, call 911. 1/16/21   Alexa Winn MD   fexofenadine TY [facility] ALLERGY) 60 MG tablet Take 60 mg by mouth daily    Historical Provider, MD      Current Facility-Administered Medications: 0.9 % sodium chloride infusion, , Intravenous, input(s): APTT in the last 72 hours. CARDIAC ENZYMES:  No results for input(s): TROPHS in the last 72 hours. No results for input(s): CKTOTAL, CKMB, CKMBINDEX, TROPONINI in the last 72 hours. Invalid input(s):  TROPONINT  No results for input(s): TROPONINT in the last 72 hours. FASTING LIPID PANEL:  Lab Results   Component Value Date    HDL 62 01/15/2021    LDLCALC 112.4 07/11/2019    TRIG 85 01/15/2021     LIVER PROFILE:No results for input(s): AST, ALT, LABALBU in the last 72 hours. CATH:      IMPRESSION:    1. Recent NSTEMI in 1/2021 s/p cardiac cath  2. Single vessel CAD involving LCX -   3. HTN  4. RADHA  5. Dyslipidemia    Patient Active Problem List   Diagnosis    Trigger finger of left thumb    Obesity (BMI 30-39. 9)    NSTEMI (non-ST elevated myocardial infarction) ([facility] [address]    COVID-19    Essential hypertension    RADHA (obstructive sleep apnea)    Non-ST elevation MI (NSTEMI) ([facility] [address]         RECOMMENDATIONS:  1. Proceed with cardiac cath +/- PCI. 2. Risks and benefits of the procedure discussed with the patient. Electronically signed on 02/01/21 at 12:13 PM by:    Phil Mayorga MD   Fellow, [address]    Long discussion with patient after exam  I have reviewed the case / procedure with resident / fellow  I have examined the patient personally  Patient agree with treatment plan as discussed before, final arrangement based on my evaluation and exam.    Risk and benefit of procedure planned were explained in details. Procedure was performed by me personally, with all aspect of the procedure being done using standard protocol. Note was modified based on my own assessment and treatment. I advised attempting PTCA of the LCX, which is moderate size vesse due to his recurrent angina. Patient agreed with risk and benefit,. Also understand the possibility of failure to achieve the outcome we hope for in CTE.     Alesia Mast MD  [place]

## 2021-02-01 NOTE — PROGRESS NOTES
Report given to [place]. Right groin assessed by Zoya and writer. Groin appears puffy but soft. Pedal assessment unchanged.

## 2021-02-01 NOTE — PROGRESS NOTES
Received post cath procedure to [facility] room 6. Assessment obtained. Restrictions reviewed with patient. Post procedure pathway initiated. groin site soft , clean dry and intact. No hematoma noted. Family at side. Patient without complaints. Head of bed flat with right leg straight.

## 2021-02-01 NOTE — OP NOTE
[facility]    CARDIAC CATHETERIZATION    Date:   2/1/2021  Patient name:  Meka Gomez  Date of admission:  2/1/2021  9:27 AM  MRN:   [id number]  YOB: 1960    Operators:  Primary:   Alesia Mast MD (Attending Physician)      Procedure performed:       [x] Left Heart Catheterization. [] Graft Angiography.  [] Left Ventriculography. [] Right Heart Catheterization. [] Coronary Angiography. [] Aortic Valve Studies. [x] PCI: Lcx-     [] Other:       Pre Procedure Conscious Sedation Data:  ASA Class:    [] I [x] II [] III [] IV    Mallampati Class:  [] I [x] II [] III [] IV      Indication:  [x] Single vessel CAD      [] + Stress test  [] ACS      [] + EKG Changes  [x] Non Q MI       [x] Significant Risk Factors  [] Recurrent Angina             [] Diabetes Mellitus    [] New LBBB      [] Uncontrolled HTN. [] CHF / Low EF changes     [] Abnormal CTA / Ca Score      Procedure:  Access:  [x] Femoral  [] Radial  artery       [x] Right  [] Left    Procedure: After informed consent was obtained with explanation of the risks and benefits, patient was brought to the cath lab. The access area was prepped and draped in sterile fashion. 1% lidocaine was used for local block. The artery was cannulated with 6  Fr sheath with brisk arterial blood return. The side port was frequently flushed and aspirated with normal saline. Findings:    LCx:         Lesion on Prox CX: Mid subsection. 100% stenosis 12 mm length reduced to     0%. Pre procedure YESSENIA 0 flow was noted. Post Procedure YESSENIA III flow was     present. Poor runoff was present. The lesion was diagnosed as High Risk     (C).       Devices used         - Luge Wire 182 cm. Number of passes: 1.         - Mini Trek Balloon 2.0mm x 12mm. 6 inflation(s) to a max pressure of:     14 betty.         - Xience Kamila 2.75 x 28 ALBERTO. 2 inflation(s) to a max pressure of: 13     betty.         Lesion on 1st Ob Ailyn: Distal subsection. 75% stenosis 5 mm length reduced     to 15%. Pre procedure YESSENIA 0 flow was noted. Post Procedure YESSENIA III flow     was present. Poor runoff was present. The lesion was diagnosed as High     Risk (C).       Devices used         - Mini Trek Balloon 2.0mm x 12mm. 1 inflation(s) to a max pressure of:     14 betty.        Coronary Tree        Dominance: Right   The LV gram was not performed. Estimated Blood Loss: 10 mL   Conclusions        Procedure Summary        Successful PTCA -ALBERTO Proximal - mid LCX, with PTCA distal OM        Recommendations        Post stent protocol    Risk factor modification. ____________________________________________________________________    History and Risk Factors    [x] Hypertension     [] Family history of CAD  [x] Hyperlipidemia     [] Cerebrovascular Disease   [] Prior MI       [] Peripheral Vascular disease   [] Prior PCI              [] Diabetes Mellitus    [] Left Main PCI. [] Currently on Dialysis. [] Prior CABG. [] Currently smoker. [] Cardiac Arrest outside of healthcare facility. [] Yes    [x] No        Witnessed     [] Yes   [] No     Arrest after arrival of EMS  [] Yes   [] No     [] Cardiac Arrest at other Facility. [] Yes   [x] No    Pre-Procedure Information. Heart Failure       [] Yes    [x] No        Class  [] I      [] II  [] III    [] IV. New Diagnosis    [] Yes  [] No    HF Type      [] Systolic   [] Diastolic          [] Unknown. Diagnostic Test:   EKG       [] Normal   [x] Abnormal    New antiarrhythmia medications:    [] Yes   [] No   New onset atrial fibrillation / Flutter     [] Yes   [] No   ECG Abnormalities:      [] V. Fib   [] Siria V. Tach           [] NS V. T   [] New LBBB           [] T.  Inv  []  ST dev > 0.5 mm         [] PVC's freq  [] PVC's infrequent    Stress Test Performed:      [] Yes    [x] No     Type:     [] Stress Echo   [] Exercise Stress Test (no imaging)      [] Stress Nuclear  [] Stress Imaging     Results   [] Negative   [] Positive        [] Indeterminate  [] Unavailable     If Positive/ Risk / Extent of Ischemia:       [] Low  [] Intermediate         [] High  [] Unavailable      Cardiac CTA Performed:     [] Yes    [x] No      Results   [] CAD   [] Non obstructive CAD      [] No CAD   [] Uncertain      [] Unknown   [] Structural Disease. Pre Procedure Medications:   [x] Yes    [] No         [x] ASA   [x] Beta Blockers      [] Nitrate   [] Ca Channel Blockers      [] Ranolazine   [x] Statin       [x] Plavix/Others antiplatelets      Electronically signed on 2/1/2021 at 12:37 PM by:    Concetta Guzman MD  Fellow, [address]       I interviewed the patient personally, and examined by me during the visit. I have reviewed the H&P/Consult / Progress note as completed, and made appropriate changes to the patient exam and treatment plans.       I have reviewed the case with resident / fellow    Patient treatment plan was explained to patient, correction in notes was made as appropriate, and discussed final arrangement based on  my evaluation and exam.    Additional Recommendations:      Frank Andujar MD  [facility]

## 2021-02-01 NOTE — PROGRESS NOTES
[x] DISCHARGE INSTRUCTIONS GIVEN WITH 2 WEEK APPT. MADE AND . DOCUMENTED Appointment made with Dr Eliseo Zamora for   February 15, 2021 at 10:30am  [x] STENT/PCI GUIDELINES GIVEN    [x] ASA  PRESCRIBED POST PROCEDURE    [x] WAS A BETA BLOCKER ORDERED IF YES WAS SCRIPT GIVEN TO PT.    [x] IF EF < 45 % WAS AN ACE INHIBITOR ORDERED    [x] WAS PLAVIX,EFFIENT,BRILINTA, ORDERED IF YES WAS SCRIPT GIVEN TO       PT,AND INSTRUCTIONS ON IMPORTANCE OF MED    [x] DOES PT. NEED 30 & 90 DAY SCRIPTS    [x] CONCERNS ON PURCHASE OF ANTIPLATELETS IF YES.  SEE PROCEDURE ON      PROCESS FOR PT.S TO OBTAIN THESE MEDS    [x] IS PT. ON STATINS IF NO WERE STATINS ORDERED AND SCRIPT GIVEN    [x] WERE MEDS RECONCILED, WAS [employer] INFORMATION ON MEDS GIVEN     TO PT.    [x] PRINT DISCHARGE MED LIST CHECK AVS FOR CURRENT MEDS    [x] WAS STENT CARD GIVEN TO PT.

## 2021-02-01 NOTE — PROGRESS NOTES
Small hematoma noted to right groin. Manual pressure held for 8 minutes. Groin soften, no bleeding noted. Pedal pulses per doppler.

## 2021-02-10 ENCOUNTER — OFFICE VISIT (OUTPATIENT)
Dept: FAMILY MEDICINE CLINIC | Age: 61
End: 2021-02-10
Payer: COMMERCIAL

## 2021-02-10 VITALS
SYSTOLIC BLOOD PRESSURE: 124 MMHG | BODY MASS INDEX: 33.3 KG/M2 | WEIGHT: 219 LBS | HEART RATE: 72 BPM | RESPIRATION RATE: 17 BRPM | DIASTOLIC BLOOD PRESSURE: 80 MMHG

## 2021-02-10 DIAGNOSIS — L50.9 HIVES: Primary | ICD-10-CM

## 2021-02-10 PROCEDURE — 1036F TOBACCO NON-USER: CPT | Performed by: NURSE PRACTITIONER

## 2021-02-10 PROCEDURE — 99213 OFFICE O/P EST LOW 20 MIN: CPT | Performed by: NURSE PRACTITIONER

## 2021-02-10 PROCEDURE — G8484 FLU IMMUNIZE NO ADMIN: HCPCS | Performed by: NURSE PRACTITIONER

## 2021-02-10 PROCEDURE — 96372 THER/PROPH/DIAG INJ SC/IM: CPT | Performed by: NURSE PRACTITIONER

## 2021-02-10 PROCEDURE — 1111F DSCHRG MED/CURRENT MED MERGE: CPT | Performed by: NURSE PRACTITIONER

## 2021-02-10 PROCEDURE — 3017F COLORECTAL CA SCREEN DOC REV: CPT | Performed by: NURSE PRACTITIONER

## 2021-02-10 PROCEDURE — G8417 CALC BMI ABV UP PARAM F/U: HCPCS | Performed by: NURSE PRACTITIONER

## 2021-02-10 PROCEDURE — G8427 DOCREV CUR MEDS BY ELIG CLIN: HCPCS | Performed by: NURSE PRACTITIONER

## 2021-02-10 RX ORDER — FAMOTIDINE 20 MG/1
20 TABLET, FILM COATED ORAL 2 TIMES DAILY
Qty: 60 TABLET | Refills: 0 | Status: SHIPPED | OUTPATIENT
Start: 2021-02-10 | End: 2021-02-25

## 2021-02-10 RX ORDER — METHYLPREDNISOLONE ACETATE 40 MG/ML
40 INJECTION, SUSPENSION INTRA-ARTICULAR; INTRALESIONAL; INTRAMUSCULAR; SOFT TISSUE ONCE
Status: COMPLETED | OUTPATIENT
Start: 2021-02-10 | End: 2021-02-10

## 2021-02-10 RX ORDER — PREDNISONE 20 MG/1
TABLET ORAL
COMMUNITY
Start: 2021-02-08 | End: 2021-02-25

## 2021-02-10 RX ADMIN — METHYLPREDNISOLONE ACETATE 40 MG: 40 INJECTION, SUSPENSION INTRA-ARTICULAR; INTRALESIONAL; INTRAMUSCULAR; SOFT TISSUE at 11:18

## 2021-02-10 ASSESSMENT — ENCOUNTER SYMPTOMS
URTICARIA: 1
SHORTNESS OF BREATH: 0

## 2021-02-10 NOTE — PATIENT INSTRUCTIONS
Hold oral prednisone. Start pepcid twice daily. Zyrtec during the day at work. Once home take Diphenhydramine (benadryl) 50 mg every 6 hours as needed for itching. Do not operate heavy machinery or drive while taking this medication. Keep cool. Follow up with primary care provider in 1 to 2 days if needed. Patient Education            Hives: Care Instructions  Your Care Instructions  Hives are raised, red, itchy patches of skin. They are also called wheals or welts. They usually have red borders and pale centers. Hives range in size from ¼ inch to 3 inches or more across. They may seem to move from place to place on the skin. Several hives may form a large area of raised, red skin. You can get hives after an insect sting, after taking medicine or eating certain foods, or because of infection or stress. Other causes include plants, things you breathe in, makeup, heat, cold, sunlight, and latex. You cannot spread hives to other people. Hives may last a few minutes or a few days, but a single spot may last less than 36 hours. Follow-up care is a key part of your treatment and safety. Be sure to make and go to all appointments, and call your doctor if you are having problems. It's also a good idea to know your test results and keep a list of the medicines you take. How can you care for yourself at home? · Avoid whatever you think may have caused your hives, such as a certain food or medicine. However, you may not know the cause. · Put a cool, wet towel on the area to relieve itching. · Take an over-the-counter antihistamine, such as diphenhydramine (Benadryl), cetirizine (Zyrtec), or loratadine (Claritin), to help stop the hives and calm the itching. Read and follow directions on the label. These medicines can make you feel sleepy. Do not drive while using them. · Stay away from strong soaps, detergents, and chemicals. These can make itching worse. When should you call for help? Call 911 anytime you think you may need emergency care. For example, call if:    · You have symptoms of a severe allergic reaction. These may include:  ? Sudden raised, red areas (hives) all over your body. ? Swelling of the throat, mouth, lips, or tongue. ? Trouble breathing. ? Passing out (losing consciousness). Or you may feel very lightheaded or suddenly feel weak, confused, or restless. Call your doctor now or seek immediate medical care if:    · You have symptoms of an allergic reaction, such as:  ? A rash or hives (raised, red areas on the skin). ? Itching. ? Swelling. ? Belly pain, nausea, or vomiting.     · You get hives after you start a new medicine.     · Hives have not gone away after 24 hours. Watch closely for changes in your health, and be sure to contact your doctor if:    · You do not get better as expected. Where can you learn more? Go to https://[email or web address].[employer]ix. org and sign in to your [employer] account. Enter W419 in the [employer] box to learn more about \"Hives: Care Instructions. \"     If you do not have an account, please click on the \"Sign Up Now\" link. Current as of: June 26, 2019               Content Version: 12.6  © 7567-8746 [employer]. Care instructions adapted under license by [facility] ([facility]). If you have questions about a medical condition or this instruction, always ask your healthcare professional. [place] [address] any warranty or liability for your use of this information.

## 2021-02-10 NOTE — PROGRESS NOTES
[address], APRN-[facility]  [address]  Phone:  [phone]  Fax:  [phone]  Jena Keane is a 64 y.o. male who presents today for his medical conditions/complaints as noted below. Jena Keane c/o of Urticaria (x3 days-all over-was in urgent care in [place]-on oral prednisone)      HPI:     Urticaria  This is a new problem. The current episode started in the past 7 days (2 days). The problem is unchanged. The rash is diffuse. The rash is characterized by redness, swelling and itchiness. He was exposed to nothing. Pertinent negatives include no fever or shortness of breath. Treatments tried: zyrtec and prednisone oral. The treatment provided no relief.        Wt Readings from Last 3 Encounters:   02/10/21 219 lb (99.3 kg)   02/01/21 213 lb (96.6 kg)   01/25/21 216 lb (98 kg)       Temp Readings from Last 3 Encounters:   02/01/21 98.3 °F (36.8 °C) (Oral)   01/16/21 97.9 °F (36.6 °C) (Oral)       BP Readings from Last 3 Encounters:   02/10/21 124/80   02/01/21 118/73   01/25/21 104/80       Pulse Readings from Last 3 Encounters:   02/10/21 72   02/01/21 73   01/25/21 77              Past Medical History:   Diagnosis Date    CAD (coronary artery disease)     Hypertension     RADHA (obstructive sleep apnea)       Past Surgical History:   Procedure Laterality Date    CARDIAC CATHETERIZATION  01/15/2021    Cath with Dr Camryn Richter  02/01/2021     Family History   Problem Relation Age of Onset    Heart Attack Father     Heart Attack Paternal Uncle     Heart Attack Paternal Grandfather      Social History     Tobacco Use    Smoking status: Never Smoker    Smokeless tobacco: Never Used   Substance Use Topics    Alcohol use: Yes     Frequency: Monthly or less     Comment: Once in a while      Current Outpatient Medications   Medication Sig Dispense Refill  predniSONE (DELTASONE) 20 MG tablet TAKE 1 TABLET BY MOUTH TWICE DAILY FOR 5 DAYS      famotidine (PEPCID) 20 MG tablet Take 1 tablet by mouth 2 times daily 60 tablet 0    clopidogrel (PLAVIX) 75 MG tablet Take 75 mg by mouth daily       aspirin 81 MG chewable tablet Take 1 tablet by mouth daily 30 tablet 3    nitroGLYCERIN (NITROSTAT) 0.4 MG SL tablet up to max of 3 total doses. If no relief after 1 dose, call 911. 25 tablet 3    atorvastatin (LIPITOR) 80 MG tablet Take 1 tablet by mouth nightly 30 tablet 3    metoprolol tartrate (LOPRESSOR) 25 MG tablet Take 1 tablet by mouth 2 times daily 60 tablet 3    fexofenadine (ALLEGRA ALLERGY) 60 MG tablet Take 60 mg by mouth daily       Current Facility-Administered Medications   Medication Dose Route Frequency Provider Last Rate Last Admin    methylPREDNISolone acetate (DEPO-MEDROL) injection 40 mg  40 mg Intramuscular Once Beba Adams, APRN - NP         Allergies   Allergen Reactions    Penicillins        No exam data present    Subjective:      Review of Systems   Constitutional: Negative for fever. Respiratory: Negative for shortness of breath. Objective:     /80 (Site: Right Upper Arm, Position: Sitting)   Pulse 72   Resp 17   Wt 219 lb (99.3 kg)   BMI 33.30 kg/m²     Physical Exam  Vitals signs reviewed. Constitutional:       Appearance: Normal appearance. He is not ill-appearing. HENT:      Head: Normocephalic. Nose: Nose normal. No congestion or rhinorrhea. Mouth/Throat:      Mouth: Mucous membranes are moist.      Pharynx: Oropharynx is clear. No posterior oropharyngeal erythema. Cardiovascular:      Rate and Rhythm: Normal rate. Pulmonary:      Effort: Pulmonary effort is normal.      Breath sounds: Normal breath sounds. Skin:     General: Skin is warm and dry. Capillary Refill: Capillary refill takes less than 2 seconds. Findings: Rash present.  Rash is urticarial.          Neurological: Mental Status: He is alert. Assessment:      Diagnosis Orders   1. Hives  methylPREDNISolone acetate (DEPO-MEDROL) injection 40 mg    famotidine (PEPCID) 20 MG tablet     No results found for this visit on 02/10/21. Plan:       Hold oral prednisone. Start pepcid twice daily. Zyrtec during the day at work. Once home take Diphenhydramine (benadryl) 50 mg every 6 hours as needed for itching. Do not operate heavy machinery or drive while taking this medication. Keep cool. Follow up with primary care provider in 1 to 2 days if needed. There are no Patient Instructions on file for this visit. Patient/Caregiver instructed on use, benefit, and side effects of prescribed medications. All patient/parent/caregiver questions answered. Patient/parent/caregiver voiced understanding. Reviewed health maintenance. Instructed to continue current medications, diet and exercise. Patient agreed with treatment plan. Follow up as directed.            Electronically signed by MARIO Woodruff NP on2/10/2021

## 2021-02-25 ENCOUNTER — OFFICE VISIT (OUTPATIENT)
Dept: FAMILY MEDICINE CLINIC | Age: 61
End: 2021-02-25
Payer: COMMERCIAL

## 2021-02-25 VITALS
DIASTOLIC BLOOD PRESSURE: 72 MMHG | HEART RATE: 84 BPM | TEMPERATURE: 97.9 F | HEIGHT: 68 IN | BODY MASS INDEX: 32.89 KG/M2 | OXYGEN SATURATION: 96 % | SYSTOLIC BLOOD PRESSURE: 110 MMHG | WEIGHT: 217 LBS

## 2021-02-25 DIAGNOSIS — I25.10 CORONARY ARTERY DISEASE INVOLVING NATIVE CORONARY ARTERY OF NATIVE HEART WITHOUT ANGINA PECTORIS: ICD-10-CM

## 2021-02-25 DIAGNOSIS — M47.22 OSTEOARTHRITIS OF SPINE WITH RADICULOPATHY, CERVICAL REGION: ICD-10-CM

## 2021-02-25 DIAGNOSIS — M48.02 FORAMINAL STENOSIS OF CERVICAL REGION: Primary | ICD-10-CM

## 2021-02-25 PROCEDURE — G8427 DOCREV CUR MEDS BY ELIG CLIN: HCPCS | Performed by: FAMILY MEDICINE

## 2021-02-25 PROCEDURE — 3017F COLORECTAL CA SCREEN DOC REV: CPT | Performed by: FAMILY MEDICINE

## 2021-02-25 PROCEDURE — 1036F TOBACCO NON-USER: CPT | Performed by: FAMILY MEDICINE

## 2021-02-25 PROCEDURE — 99214 OFFICE O/P EST MOD 30 MIN: CPT | Performed by: FAMILY MEDICINE

## 2021-02-25 PROCEDURE — G8417 CALC BMI ABV UP PARAM F/U: HCPCS | Performed by: FAMILY MEDICINE

## 2021-02-25 PROCEDURE — G8484 FLU IMMUNIZE NO ADMIN: HCPCS | Performed by: FAMILY MEDICINE

## 2021-02-25 NOTE — PROGRESS NOTES
[address] [address]  Dept: [phone]  Dept Fax: [phone]    Meka Gomez is a 64 y.o. male who presents today for his medical conditions/complaints as noted below. Meka Gomez c/o of Follow-Up from Hospital      HPI:     HPI   Pt complaining of hand pain concern for possible nerve issues with 2 recent hospital visits. Had hives per pt and treated with shot at [facility] clinic. Then couple days later severe pains in arms and into hands. Had stopped cholesterol medication stopped though persisted issues couple days later. Noted episode where right hand weakness was noted for several hours. Pt here in follow up from recent hospital visit. Noted on CC'd results DJD and cervical foraminal stenosis. On nerve pain 3 times daily per pt. Had 2 vessel stenting with circumflex and OM vessels    Went bowling and noted knee and foot pains after     BP Readings from Last 3 Encounters:   02/25/21 110/72   02/10/21 124/80   02/01/21 118/73          (goal 120/80)    Past Medical History:   Diagnosis Date    CAD (coronary artery disease)     Hypertension     RADHA (obstructive sleep apnea)       Past Surgical History:   Procedure Laterality Date    CARDIAC CATHETERIZATION  01/15/2021    Cath with Dr Monson Distance  02/01/2021    stent x 2       Family History   Problem Relation Age of Onset    Heart Attack Father     Heart Attack Paternal Uncle     Heart Attack Paternal Grandfather        Social History     Tobacco Use    Smoking status: Never Smoker    Smokeless tobacco: Never Used   Substance Use Topics    Alcohol use: Yes     Frequency: Monthly or less     Comment: Once in a while      Prior to Visit Medications    Medication Sig Taking?  Authorizing Provider   predniSONE (DELTASONE) 20 MG tablet TAKE 1 TABLET BY MOUTH TWICE DAILY FOR 5 DAYS  Historical Provider, MD famotidine (PEPCID) 20 MG tablet Take 1 tablet by mouth 2 times daily  MARIO Mccracken NP   clopidogrel (PLAVIX) 75 MG tablet Take 75 mg by mouth daily   Historical Provider, MD   aspirin 81 MG chewable tablet Take 1 tablet by mouth daily  Carlos Dubose MD   nitroGLYCERIN (NITROSTAT) 0.4 MG SL tablet up to max of 3 total doses. If no relief after 1 dose, call 911. Carlos Dubose MD   atorvastatin (LIPITOR) 80 MG tablet Take 1 tablet by mouth nightly  Carlos Dubose MD   metoprolol tartrate (LOPRESSOR) 25 MG tablet Take 1 tablet by mouth 2 times daily  Carlos Dubose MD   fexofenadine (ALLEGRA ALLERGY) 60 MG tablet Take 60 mg by mouth daily  Historical Provider, MD     Allergies   Allergen Reactions    Penicillins        Health Maintenance   Topic Date Due    HIV screen  04/21/1977    Diabetes screen  04/21/2002    Flu vaccine (1) 09/01/2020    Shingles Vaccine (1 of 2) 07/06/2023 (Originally 4/21/2012)    Potassium monitoring  02/12/2022    Creatinine monitoring  02/12/2022    Colon cancer screen fecal DNA test (Cologuard)  07/30/2022    Lipid screen  01/15/2026    DTaP/Tdap/Td vaccine (2 - Td) 07/09/2029    Hepatitis C screen  Completed    Hepatitis A vaccine  Aged Out    Hepatitis B vaccine  Aged Out    Hib vaccine  Aged Out    Meningococcal (ACWY) vaccine  Aged Out    Pneumococcal 0-64 years Vaccine  Aged Out       Subjective:      Review of Systems   Constitutional:        2/14/21 went to [facility] for left hand numbness, currently not having any numbness, states he had pain that just comes out of nowhere and will hit him in random spots of his body, was taken off his cholesterol medication. So went to [facility] on 2/12/21    Believes he has a nerve problem. Neurological: Negative for dizziness, weakness, light-headedness, numbness and headaches.        Objective: /72 (Site: Left Upper Arm, Position: Sitting, Cuff Size: Large Adult)   Pulse 84   Temp 97.9 °F (36.6 °C) (Temporal)   Ht 5' 8\" (1.727 m)   Wt 217 lb (98.4 kg)   SpO2 96%   BMI 32.99 kg/m²     Physical Exam  Constitutional:       General: He is not in acute distress. Appearance: He is not ill-appearing or toxic-appearing. HENT:      Head: Normocephalic. Eyes:      Conjunctiva/sclera: Conjunctivae normal.   Cardiovascular:      Rate and Rhythm: Normal rate. Pulmonary:      Effort: Pulmonary effort is normal. No respiratory distress. Musculoskeletal:         General: No deformity. Cervical back: Normal.      Thoracic back: Normal.      Lumbar back: Normal.      Comments: Current  strength 5/5 bilateral,    Neurological:      Mental Status: He is alert. Psychiatric:         Thought Content: Thought content normal.         Judgment: Judgment normal.         Assessment:     1. Foraminal stenosis of cervical region    2. Osteoarthritis of spine with radiculopathy, cervical region    3. Coronary artery disease involving native coronary artery of native heart without angina pectoris      No results found for this visit on 02/25/21.       Plan:     Orders Placed This Encounter   Procedures    MRI CERVICAL SPINE WO CONTRAST     Standing Status:   Future     Standing Expiration Date:   2/25/2022   [facility]     Referral Priority:   Routine     Referral Type:   Eval and Treat     Referral Reason:   Specialty Services Required     Referral Location:   [facility]     Requested Specialty:   Physical Therapy     Number of Visits Requested:   [address], [place], Neurosurgery, [place]     Referral Priority:   Routine     Referral Type:   Eval and Treat     Referral Reason:   Specialty Services Required     Referred to Provider:   Chaz Gonzales DO     Requested Specialty:   Neurosurgery     Number of Visits Requested:   1 No follow-ups on file. Encourage neurosurgery referral to keep pt from future frustration    Patient Instructions   Reviewed likely need of some lipid treatment for confirmed blockages with Dr Maggi Jean       Discussed use, benefit, and side effects of prescribed medications. All patient questions answered. Pt voiced understanding. Patient agreed with treatment plan. Follow up as directed.      Electronically signed by Merna Abarca MD on 2/25/2021

## 2021-03-08 ENCOUNTER — TELEPHONE (OUTPATIENT)
Dept: FAMILY MEDICINE CLINIC | Age: 61
End: 2021-03-08

## 2021-03-08 NOTE — TELEPHONE ENCOUNTER
Pt may discuss further testing with neurosurgery appt noted on 3/18/21 since he had recent cardiac stenting procedure.   Thanks

## 2021-03-08 NOTE — TELEPHONE ENCOUNTER
[facility] stating that pts insurance denied his MRI. Want to know if you want to appeal it or do other testing.

## 2021-03-18 ENCOUNTER — OFFICE VISIT (OUTPATIENT)
Dept: NEUROSURGERY | Age: 61
End: 2021-03-18
Payer: COMMERCIAL

## 2021-03-18 VITALS
DIASTOLIC BLOOD PRESSURE: 78 MMHG | BODY MASS INDEX: 33.63 KG/M2 | WEIGHT: 221.2 LBS | SYSTOLIC BLOOD PRESSURE: 134 MMHG | HEART RATE: 84 BPM

## 2021-03-18 DIAGNOSIS — M54.12 CERVICAL RADICULOPATHY: Primary | ICD-10-CM

## 2021-03-18 PROCEDURE — G8484 FLU IMMUNIZE NO ADMIN: HCPCS | Performed by: NEUROLOGICAL SURGERY

## 2021-03-18 PROCEDURE — G8427 DOCREV CUR MEDS BY ELIG CLIN: HCPCS | Performed by: NEUROLOGICAL SURGERY

## 2021-03-18 PROCEDURE — G8417 CALC BMI ABV UP PARAM F/U: HCPCS | Performed by: NEUROLOGICAL SURGERY

## 2021-03-18 PROCEDURE — 1036F TOBACCO NON-USER: CPT | Performed by: NEUROLOGICAL SURGERY

## 2021-03-18 PROCEDURE — 3017F COLORECTAL CA SCREEN DOC REV: CPT | Performed by: NEUROLOGICAL SURGERY

## 2021-03-18 PROCEDURE — 99214 OFFICE O/P EST MOD 30 MIN: CPT | Performed by: NEUROLOGICAL SURGERY

## 2021-03-18 RX ORDER — GABAPENTIN 300 MG/1
300 CAPSULE ORAL 3 TIMES DAILY
COMMUNITY
End: 2021-07-26

## 2021-03-18 RX ORDER — ROSUVASTATIN CALCIUM 20 MG/1
TABLET, COATED ORAL
COMMUNITY
Start: 2021-03-15

## 2021-03-18 NOTE — PROGRESS NOTES
Department of Neurosurgery                                                 New patient clinic note      Reason for Consult:  Cervical foraminal stenosis  Requesting Physician:  Jaden Wright: Alon Daniels DO      History Obtained From:  patient    CHIEF COMPLAINT:         Chief Complaint   Patient presents with    New Patient     Foraminal stenosis of cervical region       HISTORY OF PRESENT ILLNESS:       The patient is a 64 y.o. male who presents for consultation for cervical foraminal stenosis. He has an episode of sharp pain on both arms and legs which resolved after stopping statins. He developed sharp pain down the arm with left arm numbness for about 4 hour which resolved spontaneously. This resolved in 1-2 days. This was in February. He denies weakness or numbness.      PAST MEDICAL HISTORY :       Past Medical History:        Diagnosis Date    CAD (coronary artery disease)     Hypertension     RADHA (obstructive sleep apnea)        Past Surgical History:        Procedure Laterality Date    CARDIAC CATHETERIZATION  01/15/2021    Cath with Dr Orneals [facility]  02/01/2021    stent x 2       Social History:   Social History     Socioeconomic History    Marital status:      Spouse name: Not on file    Number of children: Not on file    Years of education: Not on file    Highest education level: Not on file   Occupational History    Not on file   Social Needs    Financial resource strain: Not on file    Food insecurity     Worry: Not on file     Inability: Not on file   [employer] needs     Medical: Not on file     Non-medical: Not on file   Tobacco Use    Smoking status: Never Smoker    Smokeless tobacco: Never Used   Substance and Sexual Activity    Alcohol use: Yes     Frequency: Monthly or less     Comment: Once in a while    Drug use: No    Sexual activity: Not on file   Lifestyle    Physical activity     Days per week: Not on file     Minutes per session: Not on file    Stress: Not on file   Relationships    Social connections     Talks on phone: Not on file     Gets together: Not on file     Attends Sikhism service: Not on file     Active member of club or organization: Not on file     Attends meetings of clubs or organizations: Not on file     Relationship status: Not on file    Intimate partner violence     Fear of current or ex partner: Not on file     Emotionally abused: Not on file     Physically abused: Not on file     Forced sexual activity: Not on file   Other Topics Concern    Not on file   Social History Narrative    Not on file       Family History:       Problem Relation Age of Onset    Heart Attack Father     Heart Attack Paternal Uncle     Heart Attack Paternal Grandfather        Allergies:  Penicillins    Home Medications:  Prior to Admission medications    Medication Sig Start Date End Date Taking? Authorizing Provider   rosuvastatin (CRESTOR) 20 MG tablet TAKE 1 TABLET BY MOUTH ONCE DAILY FOR 90 DAYS 3/15/21  Yes Historical Provider, MD   gabapentin (NEURONTIN) 300 MG capsule Take 300 mg by mouth 3 times daily. Yes Historical Provider, MD   clopidogrel (PLAVIX) 75 MG tablet Take 75 mg by mouth daily  1/20/21  Yes Historical Provider, MD   aspirin 81 MG chewable tablet Take 1 tablet by mouth daily 1/17/21  Yes Ruth Jacob MD   nitroGLYCERIN (NITROSTAT) 0.4 MG SL tablet up to max of 3 total doses. If no relief after 1 dose, call 911. 1/16/21  Yes Ruth Jacob MD   metoprolol tartrate (LOPRESSOR) 25 MG tablet Take 1 tablet by mouth 2 times daily 1/16/21  Yes Ruth Jacob MD       Current Medications:   No current facility-administered medications for this visit.      REVIEW OF SYSTEMS:       CONSTITUTIONAL: negative for fatigue and malaise   EYES: negative for double vision and photophobia    HEENT: negative for tinnitus and sore throat   RESPIRATORY: negative for cough, shortness of breath   CARDIOVASCULAR: negative for chest pain, palpitations   GASTROINTESTINAL: negative for nausea, vomiting   GENITOURINARY: negative for incontinence   MUSCULOSKELETAL: negative for neck or back pain   NEUROLOGICAL: negative for seizures   PSYCHIATRIC: negative for agitated     Review of systems otherwise negative. PHYSICAL EXAM:       /78 (Site: Right Upper Arm, Position: Sitting, Cuff Size: Medium Adult)   Pulse 84   Wt 221 lb 3.2 oz (100.3 kg)   BMI 33.63 kg/m²     Gen: NAD, comfortable  HEENT: moist mucus membranes  Cardio: RRR  Pulm: chest rise symmetrically  GI: abd soft  Ext: no edema  Skin: warm    Neuro:    AOX3  CN 2-12 grossly intact  Speech articulate  Motor 5/5  Sensation symmetrical   No sales or clonus  DTR slightly more brisk in left UE. 2+ . LE 2+    LABS AND IMAGING:     CBC with Differential:    Lab Results   Component Value Date    WBC 7.4 02/12/2021    WBC 13.0 01/15/2021    RBC 4.38 02/12/2021    HGB 13.0 02/12/2021    HCT 39.5 02/12/2021    .9 02/12/2021    MCV 90.0 02/12/2021    MCH 29.6 02/12/2021    MCHC 32.9 02/12/2021    RDW 12.4 02/12/2021    LYMPHOPCT 20.25 02/12/2021    MONOPCT 7.2 01/14/2021    EOSPCT 0.5 01/14/2021    BASOPCT 0.3 01/14/2021    MONOSABS 0.4816 02/12/2021    LYMPHSABS 1.504 02/12/2021    EOSABS 0.1871 02/12/2021    BASOSABS 0.0753 02/12/2021     BMP:    Lab Results   Component Value Date     02/12/2021    K 3.7 02/12/2021     02/12/2021    CO2 31 02/12/2021    BUN 18 02/12/2021    LABALBU 3.7 02/12/2021    CREATININE 0.9 02/12/2021    CALCIUM 8.3 02/12/2021    GFRAA >60 01/15/2021    LABGLOM > 60.0 02/12/2021    LABGLOM >60 01/15/2021    GLUCOSE 115 02/12/2021       Radiology Review:  CT c-spine. Kyphotic alignment. Mild foraminal stenosis c5/6      ASSESSMENT AND PLAN:       Patient Active Problem List   Diagnosis    Trigger finger of left thumb    Obesity (BMI 30-39. 9)    NSTEMI (non-ST elevated myocardial infarction) ([facility] [address]    COVID-19    Essential hypertension    RADHA (obstructive sleep apnea)    Non-ST elevation MI (NSTEMI) [facility])         A/P:  This is a 64 y.o. male with minimally symptomatic cervical       I showed the patient the imagings and explained the diagnosis and the various treatment options      Fallon Sawyer DO     3/18/2021  2:27 PM    Marylene Gaunt, DO  Staff Neurosurgeon    This note was created using voice recognition software. There may be inaccuracies of transcription  that are inadvertently overlooked prior to the signature. There is any questions about the transcription please contact me.

## 2021-07-26 ENCOUNTER — OFFICE VISIT (OUTPATIENT)
Dept: FAMILY MEDICINE CLINIC | Age: 61
End: 2021-07-26
Payer: COMMERCIAL

## 2021-07-26 VITALS
OXYGEN SATURATION: 96 % | BODY MASS INDEX: 32.23 KG/M2 | WEIGHT: 212 LBS | DIASTOLIC BLOOD PRESSURE: 70 MMHG | HEART RATE: 76 BPM | SYSTOLIC BLOOD PRESSURE: 108 MMHG

## 2021-07-26 DIAGNOSIS — I10 ESSENTIAL HYPERTENSION: Primary | ICD-10-CM

## 2021-07-26 DIAGNOSIS — G56.03 BILATERAL CARPAL TUNNEL SYNDROME: ICD-10-CM

## 2021-07-26 DIAGNOSIS — I21.4 NSTEMI (NON-ST ELEVATED MYOCARDIAL INFARCTION) (HCC): ICD-10-CM

## 2021-07-26 DIAGNOSIS — I25.10 CORONARY ARTERY DISEASE INVOLVING NATIVE CORONARY ARTERY OF NATIVE HEART WITHOUT ANGINA PECTORIS: ICD-10-CM

## 2021-07-26 DIAGNOSIS — Z13.1 ENCOUNTER FOR SCREENING EXAMINATION FOR IMPAIRED GLUCOSE REGULATION AND DIABETES MELLITUS: ICD-10-CM

## 2021-07-26 PROCEDURE — 1036F TOBACCO NON-USER: CPT | Performed by: FAMILY MEDICINE

## 2021-07-26 PROCEDURE — 99214 OFFICE O/P EST MOD 30 MIN: CPT | Performed by: FAMILY MEDICINE

## 2021-07-26 PROCEDURE — G8427 DOCREV CUR MEDS BY ELIG CLIN: HCPCS | Performed by: FAMILY MEDICINE

## 2021-07-26 PROCEDURE — 3017F COLORECTAL CA SCREEN DOC REV: CPT | Performed by: FAMILY MEDICINE

## 2021-07-26 PROCEDURE — G8417 CALC BMI ABV UP PARAM F/U: HCPCS | Performed by: FAMILY MEDICINE

## 2021-07-26 RX ORDER — METRONIDAZOLE 500 MG/1
500 TABLET ORAL 4 TIMES DAILY
COMMUNITY
Start: 2021-07-18 | End: 2022-06-08 | Stop reason: ALTCHOICE

## 2021-07-26 SDOH — ECONOMIC STABILITY: FOOD INSECURITY: WITHIN THE PAST 12 MONTHS, THE FOOD YOU BOUGHT JUST DIDN'T LAST AND YOU DIDN'T HAVE MONEY TO GET MORE.: NEVER TRUE

## 2021-07-26 SDOH — ECONOMIC STABILITY: TRANSPORTATION INSECURITY
IN THE PAST 12 MONTHS, HAS LACK OF TRANSPORTATION KEPT YOU FROM MEETINGS, WORK, OR FROM GETTING THINGS NEEDED FOR DAILY LIVING?: NO

## 2021-07-26 SDOH — ECONOMIC STABILITY: FOOD INSECURITY: WITHIN THE PAST 12 MONTHS, YOU WORRIED THAT YOUR FOOD WOULD RUN OUT BEFORE YOU GOT MONEY TO BUY MORE.: NEVER TRUE

## 2021-07-26 SDOH — ECONOMIC STABILITY: TRANSPORTATION INSECURITY
IN THE PAST 12 MONTHS, HAS THE LACK OF TRANSPORTATION KEPT YOU FROM MEDICAL APPOINTMENTS OR FROM GETTING MEDICATIONS?: NO

## 2021-07-26 ASSESSMENT — PATIENT HEALTH QUESTIONNAIRE - PHQ9
SUM OF ALL RESPONSES TO PHQ QUESTIONS 1-9: 0
SUM OF ALL RESPONSES TO PHQ9 QUESTIONS 1 & 2: 0
1. LITTLE INTEREST OR PLEASURE IN DOING THINGS: 0
2. FEELING DOWN, DEPRESSED OR HOPELESS: 0

## 2021-07-26 ASSESSMENT — SOCIAL DETERMINANTS OF HEALTH (SDOH): HOW HARD IS IT FOR YOU TO PAY FOR THE VERY BASICS LIKE FOOD, HOUSING, MEDICAL CARE, AND HEATING?: NOT HARD AT ALL

## 2021-07-26 NOTE — PROGRESS NOTES
[address] [facility]  Dept: [phone]  Dept Fax:[phone]    Geovanna Jaime is a 63 y.o. male who presents today for his medical conditions/complaints as noted below. Geovanna Jaime is c/o of Hypertension (6mo follow up)    HPI:     HPI  Here for follow up of CAD and HTN  Taking all medications regularly  No side effects noted    other complaint currently hands numb with activity mowing or painting, while sleeping, following with cardiology    No diffuse pain    BP Readings from Last 3 Encounters:   07/26/21 108/70   03/18/21 134/78   02/25/21 110/72          (goal 120/80)    Past Medical History:   Diagnosis Date    CAD (coronary artery disease)     Hypertension     RADHA (obstructive sleep apnea)       Past Surgical History:   Procedure Laterality Date    CARDIAC CATHETERIZATION  01/15/2021    Cath with Dr Yanet Fletcher  02/01/2021    stent x 2       Family History   Problem Relation Age of Onset    Heart Attack Father     Heart Attack Paternal Uncle     Heart Attack Paternal Grandfather        Social History     Tobacco Use    Smoking status: Never Smoker    Smokeless tobacco: Never Used   Substance Use Topics    Alcohol use: Yes     Comment: Once in a while      Current Outpatient Medications   Medication Sig Dispense Refill    metroNIDAZOLE (FLAGYL) 500 MG tablet Take 500 mg by mouth 4 times daily      rosuvastatin (CRESTOR) 20 MG tablet TAKE 1 TABLET BY MOUTH ONCE DAILY FOR 90 DAYS      clopidogrel (PLAVIX) 75 MG tablet Take 75 mg by mouth daily       aspirin 81 MG chewable tablet Take 1 tablet by mouth daily 30 tablet 3    nitroGLYCERIN (NITROSTAT) 0.4 MG SL tablet up to max of 3 total doses. If no relief after 1 dose, call 911. 25 tablet 3    metoprolol tartrate (LOPRESSOR) 25 MG tablet Take 1 tablet by mouth 2 times daily 60 tablet 3     No current facility-administered medications for this visit. Allergies   Allergen Reactions    Penicillins        Health Maintenance   Topic Date Due    HIV screen  Never done    Diabetes screen  Never done    Shingles Vaccine (1 of 2) 07/06/2023 (Originally 4/21/2012)    Flu vaccine (1) 09/01/2021    Lipid screen  01/15/2022    Potassium monitoring  02/12/2022    Creatinine monitoring  02/12/2022    Colon cancer screen fecal DNA test (Cologuard)  07/30/2022    DTaP/Tdap/Td vaccine (2 - Td or Tdap) 07/09/2029    COVID-19 Vaccine  Completed    Hepatitis C screen  Completed    Hepatitis A vaccine  Aged Out    Hepatitis B vaccine  Aged Out    Hib vaccine  Aged Out    Meningococcal (ACWY) vaccine  Aged Out    Pneumococcal 0-64 years Vaccine  Aged Out       Subjective:      Review of Systems    Home BP Checks?no  Medication Compliant? yes    Objective:     /70 (Site: Left Upper Arm, Position: Sitting, Cuff Size: Large Adult)   Pulse 76   Wt 212 lb (96.2 kg)   SpO2 96%   BMI 32.23 kg/m²   Physical Exam  Vitals reviewed. HENT:      Head: Normocephalic. Eyes:      Conjunctiva/sclera: Conjunctivae normal.   Cardiovascular:      Rate and Rhythm: Normal rate. Pulmonary:      Effort: Pulmonary effort is normal. No respiratory distress. Musculoskeletal:         General: No swelling or tenderness (negative Tinel's and Phallan). Neurological:      Mental Status: He is alert. Psychiatric:         Thought Content: Thought content normal.         Judgment: Judgment normal.         Weight down 9 # from prior   Assessment:      1. Essential hypertension    2. NSTEMI (non-ST elevated myocardial infarction) ([facility] [address]    3. Bilateral carpal tunnel syndrome    4. Coronary artery disease involving native coronary artery of native heart without angina pectoris    5.  Encounter for screening examination for impaired glucose regulation and diabetes mellitus             Plan:     Patient Instructions   velcro wrist braces at night    No orders of the defined types

## 2021-09-02 LAB
ANION GAP SERPL CALCULATED.3IONS-SCNC: 5.4 MMOL/L
CHLORIDE BLD-SCNC: 106 MMOL/L (ref 98–120)
CHOLESTEROL/HDL RATIO: 2.42 RATIO (ref 0–4.5)
CHOLESTEROL: 138 MG/DL (ref 50–200)
CO2: 29 MMOL/L (ref 22–31)
CREAT SERPL-MCNC: 0.9 MG/DL (ref 0.7–1.3)
GFR CALCULATED: > 60
GLUCOSE: 107 MG/DL (ref 75–110)
HDLC SERPL-MCNC: 57 MG/DL (ref 36–68)
LDL CHOLESTEROL CALCULATED: 57.6 MG/DL (ref 0–160)
POTASSIUM SERPL-SCNC: 4.1 MMOL/L (ref 3.6–5)
SODIUM BLD-SCNC: 140 MMOL/L (ref 135–145)
TRIGL SERPL-MCNC: 117 MG/DL (ref 10–250)
VLDLC SERPL CALC-MCNC: 23 MG/DL (ref 0–50)

## 2021-10-15 ENCOUNTER — OFFICE VISIT (OUTPATIENT)
Dept: ORTHOPEDIC SURGERY | Age: 61
End: 2021-10-15
Payer: COMMERCIAL

## 2021-10-15 VITALS
OXYGEN SATURATION: 95 % | WEIGHT: 215 LBS | HEART RATE: 74 BPM | DIASTOLIC BLOOD PRESSURE: 80 MMHG | BODY MASS INDEX: 32.69 KG/M2 | SYSTOLIC BLOOD PRESSURE: 112 MMHG

## 2021-10-15 DIAGNOSIS — G56.03 BILATERAL CARPAL TUNNEL SYNDROME: Primary | ICD-10-CM

## 2021-10-15 PROCEDURE — 99203 OFFICE O/P NEW LOW 30 MIN: CPT | Performed by: ORTHOPAEDIC SURGERY

## 2021-10-15 PROCEDURE — 1036F TOBACCO NON-USER: CPT | Performed by: ORTHOPAEDIC SURGERY

## 2021-10-15 PROCEDURE — G8417 CALC BMI ABV UP PARAM F/U: HCPCS | Performed by: ORTHOPAEDIC SURGERY

## 2021-10-15 PROCEDURE — G8427 DOCREV CUR MEDS BY ELIG CLIN: HCPCS | Performed by: ORTHOPAEDIC SURGERY

## 2021-10-15 PROCEDURE — G8484 FLU IMMUNIZE NO ADMIN: HCPCS | Performed by: ORTHOPAEDIC SURGERY

## 2021-10-15 PROCEDURE — 3017F COLORECTAL CA SCREEN DOC REV: CPT | Performed by: ORTHOPAEDIC SURGERY

## 2021-10-15 NOTE — PROGRESS NOTES
ORTHOPEDIC PATIENT EVALUATION      HPI / Chief Complaint  Rebeca Dalton is a 63 y.o. right-hand-dominant male who presents for evaluation of both his hands. He indicates that he has been having intermittent numbness and tingling for the past 3 to 5 years. He denies having any precipitating trauma or injury. Left is worse than the right. It mostly bothers him at night interrupting his sleep. He states that the entire hand goes numb. 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 He denies having any associated pain or weakness. He does have some intermittent neck pain with rotation of his head to the left for the past approximately 8 months. He has seen a neurosurgeon for this. Past Medical History  Tiffanie Morachester  has a past medical history of CAD (coronary artery disease), Hypertension, and RADHA (obstructive sleep apnea). Past Surgical History  Tiffanie Morachester  has a past surgical history that includes Cardiac catheterization (01/15/2021) and Cardiac catheterization (02/01/2021). Current Medications  Current Outpatient Medications   Medication Sig Dispense Refill    metroNIDAZOLE (FLAGYL) 500 MG tablet Take 500 mg by mouth 4 times daily      rosuvastatin (CRESTOR) 20 MG tablet TAKE 1 TABLET BY MOUTH ONCE DAILY FOR 90 DAYS      clopidogrel (PLAVIX) 75 MG tablet Take 75 mg by mouth daily       aspirin 81 MG chewable tablet Take 1 tablet by mouth daily 30 tablet 3    nitroGLYCERIN (NITROSTAT) 0.4 MG SL tablet up to max of 3 total doses. If no relief after 1 dose, call 911. 25 tablet 3    metoprolol tartrate (LOPRESSOR) 25 MG tablet Take 1 tablet by mouth 2 times daily 60 tablet 3     No current facility-administered medications for this visit. Allergies  Allergies have been reviewed. Tiffanie Bah is allergic to penicillins. Social History  Tiffanie Bah  reports that he has never smoked.  He has never used smokeless tobacco. He reports current alcohol use. He reports that he does not use drugs. Family History  Livan's family history includes Heart Attack in his father, paternal grandfather, and paternal uncle. Review of Systems   History obtained from the patient. REVIEW OF SYSTEMS:   Constitution: negative for fever, chills, weight loss or malaise   Musculoskeletal: As noted in the HPI   Neurologic: As noted in the HPI    Physical Exam  Blood pressure 112/80, pulse 74, weight 215 lb (97.5 kg), SpO2 95 %. General Appearance: alert, well appearing, and in no distress  Mental Status: alert, oriented to person, place, and time  Evaluation of the patient's bilateral hands and upper extremities demonstrate intact skin without warmth, erythema, or swelling. No intrinsic hand wasting. Sensation is grossly intact light touch in all dermatomes and has a 2+ radial pulse bilaterally. He has relatively good  strength grossly. He has negative Tinel's at the carpal or cubital tunnels. He also has a negative Phalen's test.    Assessment and Plan  Rafat Glynn is a 63 y.o. old male with bilateral hand paresthesias suggestive or consistent with carpal tunnel syndrome possible cubital tunnel syndrome. I had a discussion with the patient today educating him about these conditions and discussed treatment options available to him including nonoperative and operative intervention. I recommend we proceed with some electrodiagnostic testing and a referral was made for this purpose. I also recommended we initiate treatment with nighttime splinting of his wrists and elbows. Anticipate improvement in resolution of his symptoms with this treatment and so I'll see him back as needed but he was certainly encouraged to return or call at anytime with persistent or worsening symptoms and with any questions or concerns.     This note is created with the assistance of a speech recognition program.  While intending to generate adocument that actually reflects the content of the visit, the document can still have some errors including those of syntax and sound a like substitutions which may escape proof reading. It such instances, actual meaningcan be extrapolated by contextual diversion.

## 2021-12-14 ENCOUNTER — TELEPHONE (OUTPATIENT)
Dept: ORTHOPEDIC SURGERY | Age: 61
End: 2021-12-14

## 2021-12-14 NOTE — TELEPHONE ENCOUNTER
Called pt to let him know that we do not have the report for the EMGs that he had done. He stated that he had them done at [facility] by Dr. Andie Bravo [phone]. Called number listed above and they transferred me to [phone] and stated that those are done by [facility]. I spoke with someone in that office and they will be faxing the full report over.

## 2021-12-15 ENCOUNTER — OFFICE VISIT (OUTPATIENT)
Dept: ORTHOPEDIC SURGERY | Age: 61
End: 2021-12-15
Payer: COMMERCIAL

## 2021-12-15 VITALS — WEIGHT: 215 LBS | HEIGHT: 68 IN | BODY MASS INDEX: 32.58 KG/M2

## 2021-12-15 DIAGNOSIS — G56.03 BILATERAL CARPAL TUNNEL SYNDROME: Primary | ICD-10-CM

## 2021-12-15 PROCEDURE — 3017F COLORECTAL CA SCREEN DOC REV: CPT | Performed by: ORTHOPAEDIC SURGERY

## 2021-12-15 PROCEDURE — 99213 OFFICE O/P EST LOW 20 MIN: CPT | Performed by: ORTHOPAEDIC SURGERY

## 2021-12-15 PROCEDURE — G8484 FLU IMMUNIZE NO ADMIN: HCPCS | Performed by: ORTHOPAEDIC SURGERY

## 2021-12-15 PROCEDURE — G8427 DOCREV CUR MEDS BY ELIG CLIN: HCPCS | Performed by: ORTHOPAEDIC SURGERY

## 2021-12-15 PROCEDURE — 1036F TOBACCO NON-USER: CPT | Performed by: ORTHOPAEDIC SURGERY

## 2021-12-15 PROCEDURE — G8417 CALC BMI ABV UP PARAM F/U: HCPCS | Performed by: ORTHOPAEDIC SURGERY

## 2021-12-17 NOTE — PROGRESS NOTES
ORTHOPEDIC PATIENT EVALUATION      HPI / Chief Complaint  Merlin Angeles is a 63 y.o. male who presents for evaluation of both hands and wrists. He has been seen in the past and diagnosed with carpal tunnel possible cubital tunnel syndrome. We have been treating him thus far with splinting. He states that his symptoms persist and have not abated with this treatment. He reports having numbness and tingling in both hands involving all of his fingers but when questioned closely he states that he has not really noticed any numbness in his small fingers. Past Medical History  Adamaris Multani  has a past medical history of CAD (coronary artery disease), Hypertension, and RADHA (obstructive sleep apnea). Past Surgical History  Adamaris Multani  has a past surgical history that includes Cardiac catheterization (01/15/2021) and Cardiac catheterization (02/01/2021). Current Medications  Current Outpatient Medications   Medication Sig Dispense Refill    metroNIDAZOLE (FLAGYL) 500 MG tablet Take 500 mg by mouth 4 times daily (Patient not taking: Reported on 10/15/2021)      rosuvastatin (CRESTOR) 20 MG tablet TAKE 1 TABLET BY MOUTH ONCE DAILY FOR 90 DAYS      clopidogrel (PLAVIX) 75 MG tablet Take 75 mg by mouth daily       aspirin 81 MG chewable tablet Take 1 tablet by mouth daily 30 tablet 3    nitroGLYCERIN (NITROSTAT) 0.4 MG SL tablet up to max of 3 total doses. If no relief after 1 dose, call 911. 25 tablet 3    metoprolol tartrate (LOPRESSOR) 25 MG tablet Take 1 tablet by mouth 2 times daily 60 tablet 3     No current facility-administered medications for this visit. Allergies  Allergies have been reviewed. Adamaris Multani is allergic to penicillins. Social History  Adamaris Multani  reports that he has never smoked. He has never used smokeless tobacco. He reports current alcohol use. He reports that he does not use drugs.     Family History  Livan's family history includes Heart Attack in his father, paternal grandfather, and paternal uncle.      Review of Systems   History obtained from the patient. REVIEW OF SYSTEMS:   Constitution: negative for fever, chills, weight loss or malaise   Musculoskeletal: As noted in the HPI   Neurologic: As noted in the HPI    Physical Exam  Ht 5' 8\" (1.727 m)   Wt 215 lb (97.5 kg)   BMI 32.69 kg/m²    General Appearance: alert, well appearing, and in no distress  Mental Status: alert, oriented to person, place, and time  Evaluation of both hands and upper extremities demonstrate intact skin without warmth, erythema, or notable swelling. No intrinsic hand wasting. Continues to have negative carpal tunnel and cubital tunnel Tinel's. Sensation is grossly intact light touch in all dermatomes and he has a 2+ radial pulse bilaterally with brisk capillary refill in all fingers. Assessment and Plan  Merlin Angeles is a 63 y.o. old male with bilateral hand paresthesias suggestive of carpal tunnel syndrome. He did have electrodiagnostic testing completed on 12/2/2021 and I reviewed the results confirming as much. He is undergone treatment thus far with nighttime splinting without significant improvement in his symptoms. We had a discussion today about his treatment options moving forward including continued conservative management versus surgical intervention. At this time he would like to proceed with surgery but would like to hold off until early spring because of his job. He will start with the right side in terms of the release. We discussed the details of surgery in terms of the procedure, risks and benefits of surgery, expected outcome and postoperative recovery course. Risks as discussed included were not limited to risk of infection, wound healing problems, persistent paresthesias, nerve injury, vascular injury, tendon injury, and risk of anesthesia. He demonstrated good understanding of our discussion and again would like to proceed.   He will notify us when he is ready and we will get him scheduled for surgery at his convenience. This note is created with the assistance of a speech recognition program.  While intending to generate adocument that actually reflects the content of the visit, the document can still have some errors including those of syntax and sound a like substitutions which may escape proof reading. It such instances, actual meaningcan be extrapolated by contextual diversion.

## 2022-06-08 ENCOUNTER — OFFICE VISIT (OUTPATIENT)
Dept: FAMILY MEDICINE CLINIC | Age: 62
End: 2022-06-08
Payer: COMMERCIAL

## 2022-06-08 VITALS
OXYGEN SATURATION: 96 % | BODY MASS INDEX: 33.91 KG/M2 | HEART RATE: 68 BPM | WEIGHT: 223 LBS | SYSTOLIC BLOOD PRESSURE: 122 MMHG | RESPIRATION RATE: 16 BRPM | DIASTOLIC BLOOD PRESSURE: 86 MMHG

## 2022-06-08 DIAGNOSIS — R07.89 ACUTE CHEST WALL PAIN: Primary | ICD-10-CM

## 2022-06-08 DIAGNOSIS — Z12.11 COLON CANCER SCREENING: ICD-10-CM

## 2022-06-08 PROCEDURE — G8427 DOCREV CUR MEDS BY ELIG CLIN: HCPCS | Performed by: FAMILY MEDICINE

## 2022-06-08 PROCEDURE — G8417 CALC BMI ABV UP PARAM F/U: HCPCS | Performed by: FAMILY MEDICINE

## 2022-06-08 PROCEDURE — 93000 ELECTROCARDIOGRAM COMPLETE: CPT | Performed by: FAMILY MEDICINE

## 2022-06-08 PROCEDURE — 99213 OFFICE O/P EST LOW 20 MIN: CPT | Performed by: FAMILY MEDICINE

## 2022-06-08 PROCEDURE — 1036F TOBACCO NON-USER: CPT | Performed by: FAMILY MEDICINE

## 2022-06-08 PROCEDURE — 3017F COLORECTAL CA SCREEN DOC REV: CPT | Performed by: FAMILY MEDICINE

## 2022-06-08 ASSESSMENT — PATIENT HEALTH QUESTIONNAIRE - PHQ9
SUM OF ALL RESPONSES TO PHQ QUESTIONS 1-9: 0
1. LITTLE INTEREST OR PLEASURE IN DOING THINGS: 0
SUM OF ALL RESPONSES TO PHQ9 QUESTIONS 1 & 2: 0
2. FEELING DOWN, DEPRESSED OR HOPELESS: 0
SUM OF ALL RESPONSES TO PHQ QUESTIONS 1-9: 0

## 2022-06-08 ASSESSMENT — ENCOUNTER SYMPTOMS
ABDOMINAL DISTENTION: 0
SHORTNESS OF BREATH: 0
CHEST TIGHTNESS: 0
ABDOMINAL PAIN: 0
WHEEZING: 0
NAUSEA: 0

## 2022-06-08 NOTE — PROGRESS NOTES
Tu Harp (:  1960) is a 63 y.o. male,Established patient, here for evaluation of the following chief complaint(s):  Chest Pain (Pain with movement like twisting started 3 days ago.)         ASSESSMENT/PLAN:  1. Acute chest wall pain  -     EKG 12 Lead; Future      No follow-ups on file. Subjective   SUBJECTIVE/OBJECTIVE:  Baled hay 5 days ago and for past 4 days has had anterior surface CP with twisting / movement. Had stents x 2 placed 2021 but this pain sharp and feels different. No other Sx's such as SOB, palpitations, or dizziness. On ASA 81 mg po daily. Nonsmoker. No DM. On statin and metoprolol. No NTG taken, nor ibuprofen. Review of Systems   Constitutional: Negative for activity change and fatigue. Respiratory: Negative for chest tightness, shortness of breath and wheezing. Cardiovascular: Positive for chest pain. Negative for palpitations. Gastrointestinal: Negative for abdominal distention, abdominal pain and nausea. Skin: Negative for pallor and rash. Neurological: Negative for dizziness, syncope, weakness and light-headedness. Objective   Physical Exam  Constitutional:       General: He is not in acute distress. HENT:      Head: Normocephalic. Eyes:      Extraocular Movements: Extraocular movements intact. Pupils: Pupils are equal, round, and reactive to light. Cardiovascular:      Rate and Rhythm: Normal rate and regular rhythm. Heart sounds: No murmur heard. Pulmonary:      Effort: Pulmonary effort is normal.      Breath sounds: Normal breath sounds. Musculoskeletal:         General: No swelling. Cervical back: Neck supple. Comments: Chest wall: Nontender. Lymphadenopathy:      Cervical: No cervical adenopathy. Neurological:      General: No focal deficit present. Mental Status: He is alert and oriented to person, place, and time.    Psychiatric:         Mood and Affect: Mood normal.         Behavior: Behavior normal.                  An electronic signature was used to authenticate this note.     --Lili Nolan MD

## 2022-08-31 LAB
CHOLESTEROL/HDL RATIO: 2.74
CHOLESTEROL: 137 MG/DL
HDLC SERPL-MCNC: 50 MG/DL (ref 45–60)
LDL CHOLESTEROL: 72 MG/DL
TRIGL SERPL-MCNC: 75 MG/DL
VLDLC SERPL CALC-MCNC: 15 MG/DL (ref 5–35)

## 2022-09-06 LAB — NONINV COLON CA DNA+OCC BLD SCRN STL QL: NEGATIVE

## 2023-03-06 LAB
ALBUMIN: 4.1 G/DL (ref 3.2–4.6)
ALP BLD-CCNC: 61 U/L (ref 40–150)
ALT SERPL-CCNC: 20 U/L (ref 0–55)
ANION GAP SERPL CALCULATED.3IONS-SCNC: 8 MEQ/L (ref 5–13)
AST SERPL-CCNC: 17 U/L (ref 5–34)
BILIRUB SERPL-MCNC: 0.9 MG/DL (ref 0–1)
BUN BLDV-MCNC: 12 MG/DL (ref 7–18)
CALCIUM SERPL-MCNC: 8.8 MG/DL (ref 8.8–10)
CHLORIDE BLD-SCNC: 108 MMOL/L (ref 98–107)
CHOLESTEROL/HDL RATIO: 2.37
CHOLESTEROL: 135 MG/DL
CO2: 27 MMOL/L (ref 23–31)
CREAT SERPL-MCNC: 0.96 MG/DL (ref 0.72–1.25)
GFR CALCULATED: >60
GLUCOSE BLD-MCNC: 100 MG/DL (ref 70–100)
HDLC SERPL-MCNC: 57 MG/DL (ref 45–60)
LDL CHOLESTEROL: 59 MG/DL
POTASSIUM SERPL-SCNC: 4.4 MMOL/L (ref 3.5–5.1)
SODIUM BLD-SCNC: 143 MMOL/L (ref 136–145)
TOTAL PROTEIN: 6.7 G/DL (ref 6.4–8.2)
TRIGL SERPL-MCNC: 93 MG/DL
VLDLC SERPL CALC-MCNC: 19 MG/DL (ref 5–35)

## 2023-09-27 PROBLEM — R06.02 SOB (SHORTNESS OF BREATH): Status: ACTIVE | Noted: 2023-09-27

## 2023-09-27 PROBLEM — E78.00 PURE HYPERCHOLESTEROLEMIA: Status: ACTIVE | Noted: 2023-09-27

## 2023-09-27 PROBLEM — Z98.890 S/P CARDIAC CATH: Status: ACTIVE | Noted: 2023-09-27

## 2023-09-27 PROBLEM — R94.31 ABNORMAL ECG: Status: ACTIVE | Noted: 2023-09-27

## 2023-09-27 PROBLEM — I10 HYPERTENSION, ESSENTIAL: Status: ACTIVE | Noted: 2023-09-27

## 2023-09-27 PROBLEM — E66.09 CLASS 1 OBESITY DUE TO EXCESS CALORIES WITH SERIOUS COMORBIDITY IN ADULT: Status: ACTIVE | Noted: 2023-09-27

## 2023-09-27 PROBLEM — Z95.5 S/P CORONARY ARTERY STENT PLACEMENT: Status: ACTIVE | Noted: 2023-09-27

## 2023-09-27 PROBLEM — I25.10 CORONARY ARTERY DISEASE INVOLVING NATIVE CORONARY ARTERY OF NATIVE HEART WITHOUT ANGINA PECTORIS: Status: ACTIVE | Noted: 2023-09-27
